# Patient Record
Sex: MALE | Race: WHITE | NOT HISPANIC OR LATINO
[De-identification: names, ages, dates, MRNs, and addresses within clinical notes are randomized per-mention and may not be internally consistent; named-entity substitution may affect disease eponyms.]

---

## 2017-03-01 ENCOUNTER — CHARTING TRANS (OUTPATIENT)
Dept: OTHER | Age: 62
End: 2017-03-01

## 2017-03-03 ENCOUNTER — LAB SERVICES (OUTPATIENT)
Dept: OTHER | Age: 62
End: 2017-03-03

## 2017-03-04 LAB
ALBUMIN SERPL-MCNC: 3.9 G/DL (ref 3.6–5.1)
ALBUMIN/GLOB SERPL: 1.5 (ref 1–2.4)
ALP SERPL-CCNC: 62 UNITS/L (ref 45–117)
ALT SERPL-CCNC: 50 UNITS/L
ANION GAP SERPL CALC-SCNC: 13 MMOL/L (ref 10–20)
AST SERPL-CCNC: 26 UNITS/L
BASOPHILS # BLD: 0 K/MCL (ref 0–0.3)
BASOPHILS NFR BLD: 1 %
BILIRUB SERPL-MCNC: 0.6 MG/DL (ref 0.2–1)
BUN SERPL-MCNC: 17 MG/DL (ref 6–20)
BUN/CREAT SERPL: 16 (ref 7–25)
CALCIUM SERPL-MCNC: 9.1 MG/DL (ref 8.4–10.2)
CHLORIDE SERPL-SCNC: 107 MMOL/L (ref 98–107)
CHOLEST SERPL-MCNC: 176 MG/DL
CHOLEST/HDLC SERPL: 3.5
CO2 SERPL-SCNC: 28 MMOL/L (ref 21–32)
CREAT SERPL-MCNC: 1.04 MG/DL (ref 0.67–1.17)
DIFFERENTIAL METHOD BLD: NORMAL
EOSINOPHIL # BLD: 0.3 K/MCL (ref 0.1–0.5)
EOSINOPHIL NFR BLD: 4 %
ERYTHROCYTE [DISTWIDTH] IN BLOOD: 13.4 % (ref 11–15)
GLOBULIN SER-MCNC: 2.6 G/DL (ref 2–4)
GLUCOSE SERPL-MCNC: 106 MG/DL (ref 65–99)
HBA1C MFR BLD: 6 % (ref 4.5–5.6)
HDLC SERPL-MCNC: 51 MG/DL
HEMATOCRIT: 48.6 % (ref 39–51)
HEMOGLOBIN: 16.1 G/DL (ref 13–17)
LDLC SERPL CALC-MCNC: 103 MG/DL
LENGTH OF FAST TIME PATIENT: ABNORMAL HRS
LENGTH OF FAST TIME PATIENT: ABNORMAL HRS
LYMPHOCYTES # BLD: 2.4 K/MCL (ref 1–4)
LYMPHOCYTES NFR BLD: 42 %
MEAN CORPUSCULAR HEMOGLOBIN: 30.6 PG (ref 26–34)
MEAN CORPUSCULAR HGB CONC: 33.1 G/DL (ref 32–36.5)
MEAN CORPUSCULAR VOLUME: 92.2 FL (ref 78–100)
MONOCYTES # BLD: 0.4 K/MCL (ref 0.3–0.9)
MONOCYTES NFR BLD: 8 %
NEUTROPHILS # BLD: 2.6 K/MCL (ref 1.8–7.7)
NEUTROPHILS NFR BLD: 45 %
NONHDLC SERPL-MCNC: 125 MG/DL
PLATELET COUNT: 190 K/MCL (ref 140–450)
POTASSIUM SERPL-SCNC: 4.3 MMOL/L (ref 3.4–5.1)
RED CELL COUNT: 5.27 MIL/MCL (ref 4.5–5.9)
SODIUM SERPL-SCNC: 144 MMOL/L (ref 135–145)
TOTAL PROTEIN: 6.5 G/DL (ref 6.4–8.2)
TRIGL SERPL-MCNC: 112 MG/DL
WHITE BLOOD COUNT: 5.7 K/MCL (ref 4.2–11)

## 2017-03-23 ENCOUNTER — CHARTING TRANS (OUTPATIENT)
Dept: OTHER | Age: 62
End: 2017-03-23

## 2017-07-27 ENCOUNTER — LAB SERVICES (OUTPATIENT)
Dept: OTHER | Age: 62
End: 2017-07-27

## 2017-07-30 LAB
ALBUMIN SERPL-MCNC: 3.9 G/DL (ref 3.6–5.1)
ALBUMIN/GLOB SERPL: 1.5 (ref 1–2.4)
ALP SERPL-CCNC: 65 UNITS/L (ref 45–117)
ALT SERPL-CCNC: 37 UNITS/L
ANION GAP SERPL CALC-SCNC: 12 MMOL/L (ref 10–20)
AST SERPL-CCNC: 24 UNITS/L
BASOPHILS # BLD: 0 K/MCL (ref 0–0.3)
BASOPHILS NFR BLD: 0 %
BILIRUB SERPL-MCNC: 0.6 MG/DL (ref 0.2–1)
BUN SERPL-MCNC: 18 MG/DL (ref 6–20)
BUN/CREAT SERPL: 16 (ref 7–25)
CALCIUM SERPL-MCNC: 8.8 MG/DL (ref 8.4–10.2)
CHLORIDE SERPL-SCNC: 107 MMOL/L (ref 98–107)
CO2 SERPL-SCNC: 29 MMOL/L (ref 21–32)
CREAT SERPL-MCNC: 1.1 MG/DL (ref 0.67–1.17)
DIFFERENTIAL METHOD BLD: NORMAL
EOSINOPHIL # BLD: 0.2 K/MCL (ref 0.1–0.5)
EOSINOPHIL NFR BLD: 4 %
ERYTHROCYTE [DISTWIDTH] IN BLOOD: 13.6 % (ref 11–15)
GLOBULIN SER-MCNC: 2.6 G/DL (ref 2–4)
GLUCOSE SERPL-MCNC: 90 MG/DL (ref 65–99)
HBA1C MFR BLD: 6 % (ref 4.5–5.6)
HEMATOCRIT: 45.9 % (ref 39–51)
HEMOGLOBIN: 15.2 G/DL (ref 13–17)
LENGTH OF FAST TIME PATIENT: NORMAL HRS
LYMPHOCYTES # BLD: 2.1 K/MCL (ref 1–4)
LYMPHOCYTES NFR BLD: 38 %
MEAN CORPUSCULAR HEMOGLOBIN: 30.4 PG (ref 26–34)
MEAN CORPUSCULAR HGB CONC: 33.1 G/DL (ref 32–36.5)
MEAN CORPUSCULAR VOLUME: 91.8 FL (ref 78–100)
MONOCYTES # BLD: 0.4 K/MCL (ref 0.3–0.9)
MONOCYTES NFR BLD: 8 %
NEUTROPHILS # BLD: 2.8 K/MCL (ref 1.8–7.7)
NEUTROPHILS NFR BLD: 50 %
PLATELET COUNT: 180 K/MCL (ref 140–450)
POTASSIUM SERPL-SCNC: 4.2 MMOL/L (ref 3.4–5.1)
RED CELL COUNT: 5 MIL/MCL (ref 4.5–5.9)
SODIUM SERPL-SCNC: 144 MMOL/L (ref 135–145)
TOTAL PROTEIN: 6.5 G/DL (ref 6.4–8.2)
WHITE BLOOD COUNT: 5.5 K/MCL (ref 4.2–11)

## 2017-07-31 ENCOUNTER — CHARTING TRANS (OUTPATIENT)
Dept: OTHER | Age: 62
End: 2017-07-31

## 2018-04-03 ENCOUNTER — LAB SERVICES (OUTPATIENT)
Dept: OTHER | Age: 63
End: 2018-04-03

## 2018-04-04 ENCOUNTER — CHARTING TRANS (OUTPATIENT)
Dept: OTHER | Age: 63
End: 2018-04-04

## 2018-04-04 LAB
ALBUMIN SERPL-MCNC: 3.8 G/DL (ref 3.6–5.1)
ALBUMIN/GLOB SERPL: 1.2 (ref 1–2.4)
ALP SERPL-CCNC: 66 UNITS/L (ref 45–117)
ALT SERPL-CCNC: 42 UNITS/L
ANION GAP SERPL CALC-SCNC: 10 MMOL/L (ref 10–20)
AST SERPL-CCNC: 27 UNITS/L
BILIRUB SERPL-MCNC: 0.7 MG/DL (ref 0.2–1)
BUN SERPL-MCNC: 16 MG/DL (ref 6–20)
BUN/CREAT SERPL: 15 (ref 7–25)
CALCIUM SERPL-MCNC: 9.6 MG/DL (ref 8.4–10.2)
CHLORIDE SERPL-SCNC: 105 MMOL/L (ref 98–107)
CHOLEST SERPL-MCNC: 215 MG/DL
CHOLEST/HDLC SERPL: 4.1
CO2 SERPL-SCNC: 31 MMOL/L (ref 21–32)
CREAT SERPL-MCNC: 1.09 MG/DL (ref 0.67–1.17)
GLOBULIN SER-MCNC: 3.1 G/DL (ref 2–4)
GLUCOSE SERPL-MCNC: 116 MG/DL (ref 65–99)
HBA1C MFR BLD: 6 % (ref 4.5–5.6)
HDLC SERPL-MCNC: 52 MG/DL
LDLC SERPL CALC-MCNC: 139 MG/DL
LENGTH OF FAST TIME PATIENT: ABNORMAL HRS
LENGTH OF FAST TIME PATIENT: ABNORMAL HRS
NONHDLC SERPL-MCNC: 163 MG/DL
POTASSIUM SERPL-SCNC: 4.2 MMOL/L (ref 3.4–5.1)
SODIUM SERPL-SCNC: 142 MMOL/L (ref 135–145)
TOTAL PROTEIN: 6.9 G/DL (ref 6.4–8.2)
TRIGL SERPL-MCNC: 121 MG/DL

## 2018-04-11 ENCOUNTER — CHARTING TRANS (OUTPATIENT)
Dept: OTHER | Age: 63
End: 2018-04-11

## 2018-10-18 ENCOUNTER — LAB SERVICES (OUTPATIENT)
Dept: OTHER | Age: 63
End: 2018-10-18

## 2018-10-19 ENCOUNTER — CHARTING TRANS (OUTPATIENT)
Dept: OTHER | Age: 63
End: 2018-10-19

## 2018-10-19 LAB
ALBUMIN SERPL-MCNC: 4.1 G/DL (ref 3.6–5.1)
ALBUMIN/GLOB SERPL: 1.6 (ref 1–2.4)
ALP SERPL-CCNC: 69 UNITS/L (ref 45–117)
ALT SERPL-CCNC: 43 UNITS/L
ANION GAP SERPL CALC-SCNC: 15 MMOL/L (ref 10–20)
AST SERPL-CCNC: 30 UNITS/L
BILIRUB SERPL-MCNC: 0.9 MG/DL (ref 0.2–1)
BUN SERPL-MCNC: 20 MG/DL (ref 6–20)
BUN/CREAT SERPL: 20 (ref 7–25)
CALCIUM SERPL-MCNC: 9 MG/DL (ref 8.4–10.2)
CHLORIDE SERPL-SCNC: 107 MMOL/L (ref 98–107)
CHOLEST SERPL-MCNC: 114 MG/DL
CHOLEST/HDLC SERPL: 2
CO2 SERPL-SCNC: 25 MMOL/L (ref 21–32)
CREAT SERPL-MCNC: 1.02 MG/DL (ref 0.67–1.17)
GLOBULIN SER-MCNC: 2.5 G/DL (ref 2–4)
GLUCOSE SERPL-MCNC: 105 MG/DL (ref 65–99)
HBA1C MFR BLD: 5.9 % (ref 4.5–5.6)
HDLC SERPL-MCNC: 57 MG/DL
LDLC SERPL CALC-MCNC: 43 MG/DL
LENGTH OF FAST TIME PATIENT: ABNORMAL HRS
LENGTH OF FAST TIME PATIENT: NORMAL HRS
NONHDLC SERPL-MCNC: 57 MG/DL
POTASSIUM SERPL-SCNC: 4.3 MMOL/L (ref 3.4–5.1)
SODIUM SERPL-SCNC: 143 MMOL/L (ref 135–145)
TOTAL PROTEIN: 6.6 G/DL (ref 6.4–8.2)
TRIGL SERPL-MCNC: 68 MG/DL

## 2018-10-22 ENCOUNTER — CHARTING TRANS (OUTPATIENT)
Dept: OTHER | Age: 63
End: 2018-10-22

## 2018-10-22 ENCOUNTER — MYAURORA ACCOUNT LINK (OUTPATIENT)
Dept: OTHER | Age: 63
End: 2018-10-22

## 2018-11-01 VITALS
HEIGHT: 68 IN | HEART RATE: 70 BPM | BODY MASS INDEX: 36.12 KG/M2 | RESPIRATION RATE: 18 BRPM | WEIGHT: 238.32 LBS | OXYGEN SATURATION: 96 % | DIASTOLIC BLOOD PRESSURE: 92 MMHG | SYSTOLIC BLOOD PRESSURE: 144 MMHG

## 2018-11-03 VITALS
OXYGEN SATURATION: 99 % | SYSTOLIC BLOOD PRESSURE: 120 MMHG | DIASTOLIC BLOOD PRESSURE: 64 MMHG | HEIGHT: 68 IN | RESPIRATION RATE: 18 BRPM | WEIGHT: 235 LBS | HEART RATE: 71 BPM | BODY MASS INDEX: 35.61 KG/M2

## 2018-11-05 VITALS
DIASTOLIC BLOOD PRESSURE: 86 MMHG | HEART RATE: 75 BPM | RESPIRATION RATE: 18 BRPM | WEIGHT: 242 LBS | OXYGEN SATURATION: 99 % | SYSTOLIC BLOOD PRESSURE: 124 MMHG | BODY MASS INDEX: 36.68 KG/M2 | HEIGHT: 68 IN

## 2018-11-05 VITALS
DIASTOLIC BLOOD PRESSURE: 74 MMHG | SYSTOLIC BLOOD PRESSURE: 136 MMHG | HEART RATE: 74 BPM | HEIGHT: 68 IN | TEMPERATURE: 98.1 F | WEIGHT: 241 LBS | RESPIRATION RATE: 18 BRPM | BODY MASS INDEX: 36.53 KG/M2

## 2018-11-27 VITALS
OXYGEN SATURATION: 97 % | DIASTOLIC BLOOD PRESSURE: 82 MMHG | HEART RATE: 59 BPM | BODY MASS INDEX: 35.62 KG/M2 | SYSTOLIC BLOOD PRESSURE: 164 MMHG | WEIGHT: 235.01 LBS | RESPIRATION RATE: 16 BRPM | TEMPERATURE: 97.9 F | HEIGHT: 68 IN

## 2018-12-20 RX ORDER — LOSARTAN POTASSIUM 100 MG/1
TABLET ORAL
COMMUNITY
Start: 2018-10-22 | End: 2019-05-19 | Stop reason: SDUPTHER

## 2018-12-20 RX ORDER — PRAMIPEXOLE DIHYDROCHLORIDE 0.25 MG/1
TABLET ORAL
COMMUNITY
Start: 2018-05-14 | End: 2019-01-29 | Stop reason: SDUPTHER

## 2018-12-20 RX ORDER — LOSARTAN POTASSIUM 25 MG/1
TABLET ORAL
COMMUNITY
Start: 2018-04-30 | End: 2018-12-24 | Stop reason: ALTCHOICE

## 2018-12-20 RX ORDER — ATORVASTATIN CALCIUM 40 MG/1
TABLET, FILM COATED ORAL
COMMUNITY
Start: 2018-05-14 | End: 2019-02-05 | Stop reason: SDUPTHER

## 2018-12-24 ENCOUNTER — OFFICE VISIT (OUTPATIENT)
Dept: NEUROLOGY | Age: 63
End: 2018-12-24

## 2018-12-24 VITALS
HEIGHT: 68 IN | BODY MASS INDEX: 34.86 KG/M2 | DIASTOLIC BLOOD PRESSURE: 74 MMHG | WEIGHT: 230 LBS | SYSTOLIC BLOOD PRESSURE: 126 MMHG

## 2018-12-24 DIAGNOSIS — G25.81 RLS (RESTLESS LEGS SYNDROME): Primary | ICD-10-CM

## 2018-12-24 DIAGNOSIS — R53.83 OTHER FATIGUE: ICD-10-CM

## 2018-12-24 DIAGNOSIS — M54.16 LEFT LUMBAR RADICULOPATHY: ICD-10-CM

## 2018-12-24 DIAGNOSIS — E61.1 IRON DEFICIENCY: ICD-10-CM

## 2018-12-24 PROBLEM — G43.109 MIGRAINE HEADACHE WITH AURA: Status: ACTIVE | Noted: 2018-12-24

## 2018-12-24 PROCEDURE — 99202 OFFICE O/P NEW SF 15 MIN: CPT | Performed by: SPECIALIST

## 2018-12-24 ASSESSMENT — ENCOUNTER SYMPTOMS
PSYCHIATRIC NEGATIVE: 1
RESPIRATORY NEGATIVE: 1
CONSTITUTIONAL NEGATIVE: 1

## 2018-12-26 ENCOUNTER — LAB SERVICES (OUTPATIENT)
Dept: LAB | Age: 63
End: 2018-12-26

## 2018-12-26 DIAGNOSIS — G25.81 RLS (RESTLESS LEGS SYNDROME): ICD-10-CM

## 2018-12-26 PROCEDURE — 84443 ASSAY THYROID STIM HORMONE: CPT | Performed by: SPECIALIST

## 2018-12-26 PROCEDURE — 36415 COLL VENOUS BLD VENIPUNCTURE: CPT | Performed by: SPECIALIST

## 2018-12-26 PROCEDURE — 82607 VITAMIN B-12: CPT | Performed by: SPECIALIST

## 2018-12-26 PROCEDURE — 82728 ASSAY OF FERRITIN: CPT | Performed by: SPECIALIST

## 2018-12-26 PROCEDURE — 82746 ASSAY OF FOLIC ACID SERUM: CPT | Performed by: SPECIALIST

## 2018-12-27 LAB
FERRITIN SERPL-MCNC: 143 NG/ML (ref 26–388)
FOLATE SERPL-MCNC: >24 NG/ML
TSH SERPL-ACNC: 0.87 MCUNITS/ML (ref 0.35–5)
VIT B12 SERPL-MCNC: 909 PG/ML (ref 211–911)

## 2019-01-05 ENCOUNTER — OFFICE VISIT (OUTPATIENT)
Dept: URGENT CARE | Age: 64
End: 2019-01-05

## 2019-01-05 VITALS
BODY MASS INDEX: 34.86 KG/M2 | WEIGHT: 230 LBS | RESPIRATION RATE: 22 BRPM | SYSTOLIC BLOOD PRESSURE: 134 MMHG | OXYGEN SATURATION: 97 % | HEIGHT: 68 IN | TEMPERATURE: 97.8 F | HEART RATE: 57 BPM | DIASTOLIC BLOOD PRESSURE: 81 MMHG

## 2019-01-05 DIAGNOSIS — J32.9 SINUSITIS, UNSPECIFIED CHRONICITY, UNSPECIFIED LOCATION: Primary | ICD-10-CM

## 2019-01-05 PROCEDURE — 99203 OFFICE O/P NEW LOW 30 MIN: CPT | Performed by: PHYSICIAN ASSISTANT

## 2019-01-05 RX ORDER — AMOXICILLIN 500 MG/1
500 TABLET, FILM COATED ORAL 2 TIMES DAILY
Qty: 20 TABLET | Refills: 0 | Status: SHIPPED | OUTPATIENT
Start: 2019-01-05 | End: 2019-01-15

## 2019-01-08 ENCOUNTER — OFFICE VISIT (OUTPATIENT)
Dept: NEUROLOGY | Age: 64
End: 2019-01-08

## 2019-01-08 VITALS
HEIGHT: 68 IN | BODY MASS INDEX: 34.86 KG/M2 | DIASTOLIC BLOOD PRESSURE: 75 MMHG | WEIGHT: 230 LBS | RESPIRATION RATE: 16 BRPM | SYSTOLIC BLOOD PRESSURE: 120 MMHG

## 2019-01-08 DIAGNOSIS — G25.81 RLS (RESTLESS LEGS SYNDROME): ICD-10-CM

## 2019-01-08 DIAGNOSIS — M54.16 LEFT LUMBAR RADICULOPATHY: ICD-10-CM

## 2019-01-08 DIAGNOSIS — R20.2 PARESTHESIA OF BOTH LEGS: Primary | ICD-10-CM

## 2019-01-08 PROCEDURE — 95909 NRV CNDJ TST 5-6 STUDIES: CPT | Performed by: SPECIALIST

## 2019-01-08 PROCEDURE — 95886 MUSC TEST DONE W/N TEST COMP: CPT | Performed by: SPECIALIST

## 2019-01-08 PROCEDURE — 99212 OFFICE O/P EST SF 10 MIN: CPT | Performed by: SPECIALIST

## 2019-01-29 RX ORDER — PRAMIPEXOLE DIHYDROCHLORIDE 0.25 MG/1
TABLET ORAL
Qty: 60 TABLET | Refills: 2 | Status: SHIPPED | OUTPATIENT
Start: 2019-01-29 | End: 2019-04-27 | Stop reason: SDUPTHER

## 2019-01-29 RX ORDER — PRAMIPEXOLE DIHYDROCHLORIDE 0.25 MG/1
TABLET ORAL
Qty: 60 TABLET | Refills: 2 | Status: SHIPPED | OUTPATIENT
Start: 2019-01-29 | End: 2019-01-29 | Stop reason: SDUPTHER

## 2019-02-06 RX ORDER — ATORVASTATIN CALCIUM 40 MG/1
TABLET, FILM COATED ORAL
Qty: 90 TABLET | Refills: 0 | Status: SHIPPED | OUTPATIENT
Start: 2019-02-06 | End: 2019-05-02 | Stop reason: SDUPTHER

## 2019-02-21 ENCOUNTER — APPOINTMENT (OUTPATIENT)
Dept: NEUROLOGY | Age: 64
End: 2019-02-21

## 2019-03-06 ENCOUNTER — OFFICE VISIT (OUTPATIENT)
Dept: NEUROLOGY | Age: 64
End: 2019-03-06

## 2019-03-06 VITALS
SYSTOLIC BLOOD PRESSURE: 116 MMHG | WEIGHT: 230 LBS | DIASTOLIC BLOOD PRESSURE: 72 MMHG | BODY MASS INDEX: 34.07 KG/M2 | HEIGHT: 69 IN

## 2019-03-06 DIAGNOSIS — M54.16 LEFT LUMBAR RADICULOPATHY: ICD-10-CM

## 2019-03-06 DIAGNOSIS — G25.81 RLS (RESTLESS LEGS SYNDROME): Primary | ICD-10-CM

## 2019-03-06 PROCEDURE — 99214 OFFICE O/P EST MOD 30 MIN: CPT | Performed by: SPECIALIST

## 2019-03-06 ASSESSMENT — ENCOUNTER SYMPTOMS
RESPIRATORY NEGATIVE: 1
DIZZINESS: 0
TREMORS: 0
CONSTITUTIONAL NEGATIVE: 1
HEADACHES: 0
NUMBNESS: 1
EYES NEGATIVE: 1
WEAKNESS: 0
PSYCHIATRIC NEGATIVE: 1
GASTROINTESTINAL NEGATIVE: 1

## 2019-03-06 ASSESSMENT — VISUAL ACUITY: VA_NORMAL: 1

## 2019-04-29 RX ORDER — PRAMIPEXOLE DIHYDROCHLORIDE 0.25 MG/1
TABLET ORAL
Qty: 60 TABLET | Refills: 0 | Status: SHIPPED | OUTPATIENT
Start: 2019-04-29 | End: 2019-05-26 | Stop reason: SDUPTHER

## 2019-05-02 RX ORDER — ATORVASTATIN CALCIUM 40 MG/1
TABLET, FILM COATED ORAL
Qty: 90 TABLET | Refills: 0 | Status: SHIPPED | OUTPATIENT
Start: 2019-05-02 | End: 2019-07-26 | Stop reason: SDUPTHER

## 2019-05-20 RX ORDER — LOSARTAN POTASSIUM 100 MG/1
TABLET ORAL
Qty: 90 TABLET | Refills: 0 | Status: SHIPPED | OUTPATIENT
Start: 2019-05-20 | End: 2019-08-23 | Stop reason: SDUPTHER

## 2019-05-28 RX ORDER — PRAMIPEXOLE DIHYDROCHLORIDE 0.25 MG/1
TABLET ORAL
Qty: 60 TABLET | Refills: 0 | Status: SHIPPED | OUTPATIENT
Start: 2019-05-28 | End: 2019-09-03 | Stop reason: SDUPTHER

## 2019-07-26 RX ORDER — ATORVASTATIN CALCIUM 40 MG/1
TABLET, FILM COATED ORAL
Qty: 30 TABLET | Refills: 0 | Status: SHIPPED | OUTPATIENT
Start: 2019-07-26 | End: 2019-08-26 | Stop reason: SDUPTHER

## 2019-07-26 RX ORDER — ATORVASTATIN CALCIUM 40 MG/1
TABLET, FILM COATED ORAL
Qty: 90 TABLET | Refills: 0 | OUTPATIENT
Start: 2019-07-26

## 2019-08-05 ENCOUNTER — TELEPHONE (OUTPATIENT)
Dept: FAMILY MEDICINE | Age: 64
End: 2019-08-05

## 2019-08-05 DIAGNOSIS — Z12.5 SCREENING PSA (PROSTATE SPECIFIC ANTIGEN): ICD-10-CM

## 2019-08-05 DIAGNOSIS — R73.03 PREDIABETES: Primary | ICD-10-CM

## 2019-08-05 DIAGNOSIS — E78.5 DYSLIPIDEMIA: ICD-10-CM

## 2019-08-07 ENCOUNTER — LAB SERVICES (OUTPATIENT)
Dept: FAMILY MEDICINE | Age: 64
End: 2019-08-07

## 2019-08-07 DIAGNOSIS — Z12.5 SCREENING PSA (PROSTATE SPECIFIC ANTIGEN): ICD-10-CM

## 2019-08-07 DIAGNOSIS — R73.03 PREDIABETES: ICD-10-CM

## 2019-08-07 DIAGNOSIS — E78.5 DYSLIPIDEMIA: ICD-10-CM

## 2019-08-07 PROCEDURE — G0103 PSA SCREENING: HCPCS | Performed by: FAMILY MEDICINE

## 2019-08-07 PROCEDURE — 80053 COMPREHEN METABOLIC PANEL: CPT | Performed by: FAMILY MEDICINE

## 2019-08-07 PROCEDURE — 36415 COLL VENOUS BLD VENIPUNCTURE: CPT

## 2019-08-07 PROCEDURE — 83036 HEMOGLOBIN GLYCOSYLATED A1C: CPT | Performed by: FAMILY MEDICINE

## 2019-08-07 PROCEDURE — 80061 LIPID PANEL: CPT | Performed by: FAMILY MEDICINE

## 2019-08-07 PROCEDURE — 85025 COMPLETE CBC W/AUTO DIFF WBC: CPT | Performed by: FAMILY MEDICINE

## 2019-08-08 LAB
ALBUMIN SERPL-MCNC: 4.1 G/DL (ref 3.6–5.1)
ALBUMIN/GLOB SERPL: 1.6 {RATIO} (ref 1–2.4)
ALP SERPL-CCNC: 70 UNITS/L (ref 45–117)
ALT SERPL-CCNC: 38 UNITS/L
ANION GAP SERPL CALC-SCNC: 12 MMOL/L (ref 10–20)
AST SERPL-CCNC: 24 UNITS/L
BASOPHILS # BLD AUTO: 0 K/MCL (ref 0–0.3)
BASOPHILS NFR BLD AUTO: 0 %
BILIRUB SERPL-MCNC: 0.7 MG/DL (ref 0.2–1)
BUN SERPL-MCNC: 19 MG/DL (ref 6–20)
BUN/CREAT SERPL: 19 (ref 7–25)
CALCIUM SERPL-MCNC: 9.2 MG/DL (ref 8.4–10.2)
CHLORIDE SERPL-SCNC: 111 MMOL/L (ref 98–107)
CHOLEST SERPL-MCNC: 115 MG/DL
CHOLEST/HDLC SERPL: 2 {RATIO}
CO2 SERPL-SCNC: 26 MMOL/L (ref 21–32)
CREAT SERPL-MCNC: 1.02 MG/DL (ref 0.67–1.17)
DIFFERENTIAL METHOD BLD: NORMAL
EOSINOPHIL # BLD AUTO: 0.4 K/MCL (ref 0.1–0.5)
EOSINOPHIL NFR SPEC: 6 %
ERYTHROCYTE [DISTWIDTH] IN BLOOD: 13.1 % (ref 11–15)
FASTING STATUS PATIENT QL REPORTED: ABNORMAL HRS
GLOBULIN SER-MCNC: 2.5 G/DL (ref 2–4)
GLUCOSE SERPL-MCNC: 119 MG/DL (ref 65–99)
HBA1C MFR BLD: 5.9 % (ref 4.5–5.6)
HCT VFR BLD CALC: 46.4 % (ref 39–51)
HDLC SERPL-MCNC: 58 MG/DL
HGB BLD-MCNC: 15.2 G/DL (ref 13–17)
IMM GRANULOCYTES # BLD AUTO: 0 K/MCL (ref 0–0.2)
IMM GRANULOCYTES NFR BLD: 0 %
LDLC SERPL-MCNC: 38 MG/DL
LENGTH OF FAST TIME PATIENT: NORMAL HRS
LYMPHOCYTES # BLD MANUAL: 1.9 K/MCL (ref 1–4)
LYMPHOCYTES NFR BLD MANUAL: 30 %
MCH RBC QN AUTO: 30 PG (ref 26–34)
MCHC RBC AUTO-ENTMCNC: 32.8 G/DL (ref 32–36.5)
MCV RBC AUTO: 91.5 FL (ref 78–100)
MONOCYTES # BLD MANUAL: 0.4 K/MCL (ref 0.3–0.9)
MONOCYTES NFR BLD MANUAL: 7 %
NEUTROPHILS # BLD: 3.6 K/MCL (ref 1.8–7.7)
NEUTROPHILS NFR BLD AUTO: 57 %
NONHDLC SERPL-MCNC: 57 MG/DL
NRBC BLD MANUAL-RTO: 0 /100 WBC
PLATELET # BLD: 181 K/MCL (ref 140–450)
POTASSIUM SERPL-SCNC: 4.3 MMOL/L (ref 3.4–5.1)
PROT SERPL-MCNC: 6.6 G/DL (ref 6.4–8.2)
PSA SERPL-MCNC: 0.73 NG/ML
RBC # BLD: 5.07 MIL/MCL (ref 4.5–5.9)
SODIUM SERPL-SCNC: 145 MMOL/L (ref 135–145)
TRIGL SERPL-MCNC: 94 MG/DL
WBC # BLD: 6.3 K/MCL (ref 4.2–11)

## 2019-08-12 PROBLEM — Z52.4 KIDNEY DONOR: Status: ACTIVE | Noted: 2019-08-12

## 2019-08-12 PROBLEM — B07.0 PLANTAR WARTS: Status: ACTIVE | Noted: 2019-08-12

## 2019-08-12 PROBLEM — L25.9 CONTACT DERMATITIS: Status: ACTIVE | Noted: 2019-08-12

## 2019-08-12 PROBLEM — M62.830 MUSCLE SPASM OF BACK: Status: ACTIVE | Noted: 2019-08-12

## 2019-08-12 PROBLEM — I10 BENIGN ESSENTIAL HYPERTENSION: Status: ACTIVE | Noted: 2019-08-12

## 2019-08-12 PROBLEM — Z12.11 ENCOUNTER FOR SCREENING FOR MALIGNANT NEOPLASM OF COLON: Status: ACTIVE | Noted: 2019-08-12

## 2019-08-12 PROBLEM — R73.03 PREDIABETES: Status: ACTIVE | Noted: 2019-08-12

## 2019-08-12 PROBLEM — R73.01 ELEVATED FASTING GLUCOSE: Status: ACTIVE | Noted: 2019-08-12

## 2019-08-12 PROBLEM — N45.3 ORCHITIS AND EPIDIDYMITIS: Status: ACTIVE | Noted: 2019-08-12

## 2019-08-12 PROBLEM — R07.9 CHEST PAIN: Status: ACTIVE | Noted: 2019-08-12

## 2019-08-12 PROBLEM — R73.09 ABNORMAL GLUCOSE: Status: ACTIVE | Noted: 2019-08-12

## 2019-08-12 PROBLEM — E78.5 HYPERLIPIDEMIA: Status: ACTIVE | Noted: 2019-08-12

## 2019-08-12 PROBLEM — S82.209A CLOSED FRACTURE OF TIBIA, SHAFT: Status: ACTIVE | Noted: 2019-08-12

## 2019-08-12 PROBLEM — H61.21 IMPACTED CERUMEN OF RIGHT EAR: Status: ACTIVE | Noted: 2019-08-12

## 2019-08-12 PROBLEM — N20.0 STAGHORN CALCULUS: Status: ACTIVE | Noted: 2019-08-12

## 2019-08-12 PROBLEM — H61.21 EXCESSIVE CERUMEN IN RIGHT EAR CANAL: Status: ACTIVE | Noted: 2019-08-12

## 2019-08-12 PROBLEM — M54.50 LOWER BACK PAIN: Status: ACTIVE | Noted: 2019-08-12

## 2019-08-12 PROBLEM — E66.9 OBESITY: Status: ACTIVE | Noted: 2019-08-12

## 2019-08-14 ENCOUNTER — OFFICE VISIT (OUTPATIENT)
Dept: FAMILY MEDICINE | Age: 64
End: 2019-08-14

## 2019-08-14 VITALS
DIASTOLIC BLOOD PRESSURE: 74 MMHG | RESPIRATION RATE: 17 BRPM | WEIGHT: 238.1 LBS | SYSTOLIC BLOOD PRESSURE: 126 MMHG | BODY MASS INDEX: 35.27 KG/M2 | OXYGEN SATURATION: 97 % | HEART RATE: 59 BPM | HEIGHT: 69 IN

## 2019-08-14 DIAGNOSIS — I10 BENIGN ESSENTIAL HYPERTENSION: Primary | ICD-10-CM

## 2019-08-14 DIAGNOSIS — M79.673 HEEL PAIN, UNSPECIFIED LATERALITY: ICD-10-CM

## 2019-08-14 DIAGNOSIS — E66.9 CLASS 2 OBESITY WITH BODY MASS INDEX (BMI) OF 35.0 TO 35.9 IN ADULT, UNSPECIFIED OBESITY TYPE, UNSPECIFIED WHETHER SERIOUS COMORBIDITY PRESENT: ICD-10-CM

## 2019-08-14 DIAGNOSIS — R73.03 PREDIABETES: ICD-10-CM

## 2019-08-14 DIAGNOSIS — E78.5 HYPERLIPIDEMIA, UNSPECIFIED HYPERLIPIDEMIA TYPE: ICD-10-CM

## 2019-08-14 PROCEDURE — 3074F SYST BP LT 130 MM HG: CPT | Performed by: FAMILY MEDICINE

## 2019-08-14 PROCEDURE — 3078F DIAST BP <80 MM HG: CPT | Performed by: FAMILY MEDICINE

## 2019-08-14 PROCEDURE — 99214 OFFICE O/P EST MOD 30 MIN: CPT | Performed by: FAMILY MEDICINE

## 2019-08-14 ASSESSMENT — ENCOUNTER SYMPTOMS
VOMITING: 0
WEAKNESS: 0
CHEST TIGHTNESS: 0
FEVER: 0
CHILLS: 0
BACK PAIN: 0
HEADACHES: 0
NUMBNESS: 0
CONSTIPATION: 0
UNEXPECTED WEIGHT CHANGE: 0
DIZZINESS: 0
ABDOMINAL PAIN: 0
SHORTNESS OF BREATH: 0
FATIGUE: 0
CONFUSION: 0
LIGHT-HEADEDNESS: 0
TROUBLE SWALLOWING: 0
BLOOD IN STOOL: 0
COUGH: 0
SORE THROAT: 0
SLEEP DISTURBANCE: 0
DIARRHEA: 0
NAUSEA: 0
RHINORRHEA: 0
WHEEZING: 0

## 2019-08-14 ASSESSMENT — PATIENT HEALTH QUESTIONNAIRE - PHQ9
SUM OF ALL RESPONSES TO PHQ9 QUESTIONS 1 AND 2: 0
SUM OF ALL RESPONSES TO PHQ9 QUESTIONS 1 AND 2: 0
2. FEELING DOWN, DEPRESSED OR HOPELESS: NOT AT ALL
1. LITTLE INTEREST OR PLEASURE IN DOING THINGS: NOT AT ALL

## 2019-08-23 RX ORDER — LOSARTAN POTASSIUM 100 MG/1
TABLET ORAL
Qty: 90 TABLET | Refills: 0 | Status: SHIPPED | OUTPATIENT
Start: 2019-08-23 | End: 2019-11-16 | Stop reason: SDUPTHER

## 2019-08-27 RX ORDER — ATORVASTATIN CALCIUM 40 MG/1
TABLET, FILM COATED ORAL
Qty: 90 TABLET | Refills: 0 | Status: SHIPPED | OUTPATIENT
Start: 2019-08-27 | End: 2019-11-22 | Stop reason: SDUPTHER

## 2019-09-03 RX ORDER — PRAMIPEXOLE DIHYDROCHLORIDE 0.25 MG/1
TABLET ORAL
Qty: 180 TABLET | Refills: 0 | Status: SHIPPED | OUTPATIENT
Start: 2019-09-03 | End: 2020-02-19 | Stop reason: SDUPTHER

## 2019-10-29 ENCOUNTER — OFFICE VISIT (OUTPATIENT)
Dept: URGENT CARE | Age: 64
End: 2019-10-29

## 2019-10-29 DIAGNOSIS — Z23 INFLUENZA VACCINE ADMINISTERED: Primary | ICD-10-CM

## 2019-10-29 PROCEDURE — 90686 IIV4 VACC NO PRSV 0.5 ML IM: CPT

## 2019-10-29 PROCEDURE — 90471 IMMUNIZATION ADMIN: CPT

## 2019-11-18 RX ORDER — LOSARTAN POTASSIUM 100 MG/1
TABLET ORAL
Qty: 90 TABLET | Refills: 0 | Status: SHIPPED | OUTPATIENT
Start: 2019-11-18 | End: 2020-02-14 | Stop reason: SDUPTHER

## 2019-11-21 ENCOUNTER — E-ADVICE (OUTPATIENT)
Dept: FAMILY MEDICINE | Age: 64
End: 2019-11-21

## 2019-11-22 RX ORDER — ATORVASTATIN CALCIUM 40 MG/1
TABLET, FILM COATED ORAL
Qty: 90 TABLET | Refills: 0 | Status: SHIPPED | OUTPATIENT
Start: 2019-11-22 | End: 2020-02-19 | Stop reason: SDUPTHER

## 2020-02-14 RX ORDER — LOSARTAN POTASSIUM 100 MG/1
TABLET ORAL
Qty: 30 TABLET | Refills: 0 | Status: SHIPPED | OUTPATIENT
Start: 2020-02-14 | End: 2020-03-17

## 2020-02-14 RX ORDER — LOSARTAN POTASSIUM 100 MG/1
TABLET ORAL
Qty: 30 TABLET | Refills: 0 | Status: SHIPPED | OUTPATIENT
Start: 2020-02-14 | End: 2020-02-14 | Stop reason: SDUPTHER

## 2020-02-20 RX ORDER — PRAMIPEXOLE DIHYDROCHLORIDE 0.25 MG/1
TABLET ORAL
Qty: 60 TABLET | Refills: 0 | Status: SHIPPED | OUTPATIENT
Start: 2020-02-20

## 2020-02-20 RX ORDER — ATORVASTATIN CALCIUM 40 MG/1
TABLET, FILM COATED ORAL
Qty: 30 TABLET | Refills: 0 | Status: SHIPPED | OUTPATIENT
Start: 2020-02-20 | End: 2020-02-20 | Stop reason: SDUPTHER

## 2020-02-20 RX ORDER — ATORVASTATIN CALCIUM 40 MG/1
TABLET, FILM COATED ORAL
Qty: 30 TABLET | Refills: 0 | Status: SHIPPED | OUTPATIENT
Start: 2020-02-20

## 2020-02-20 RX ORDER — PRAMIPEXOLE DIHYDROCHLORIDE 0.25 MG/1
TABLET ORAL
Qty: 60 TABLET | Refills: 0 | Status: SHIPPED | OUTPATIENT
Start: 2020-02-20 | End: 2020-02-20 | Stop reason: SDUPTHER

## 2020-03-17 RX ORDER — LOSARTAN POTASSIUM 100 MG/1
TABLET ORAL
Qty: 30 TABLET | Refills: 0 | Status: SHIPPED | OUTPATIENT
Start: 2020-03-17

## 2020-04-21 RX ORDER — ATORVASTATIN CALCIUM 40 MG/1
TABLET, FILM COATED ORAL
Qty: 30 TABLET | Refills: 0 | OUTPATIENT
Start: 2020-04-21

## 2020-04-21 RX ORDER — PRAMIPEXOLE DIHYDROCHLORIDE 0.25 MG/1
TABLET ORAL
Qty: 60 TABLET | Refills: 0 | OUTPATIENT
Start: 2020-04-21

## 2020-05-04 ENCOUNTER — E-ADVICE (OUTPATIENT)
Dept: NEUROLOGY | Age: 65
End: 2020-05-04

## 2020-05-04 RX ORDER — ATORVASTATIN CALCIUM 40 MG/1
TABLET, FILM COATED ORAL
Qty: 30 TABLET | Refills: 0 | OUTPATIENT
Start: 2020-05-04

## 2020-05-05 ENCOUNTER — E-ADVICE (OUTPATIENT)
Dept: FAMILY MEDICINE | Age: 65
End: 2020-05-05

## 2020-05-27 ENCOUNTER — TELEPHONE (OUTPATIENT)
Dept: NEUROLOGY | Age: 65
End: 2020-05-27

## 2022-04-27 ENCOUNTER — PATIENT OUTREACH (OUTPATIENT)
Dept: FAMILY MEDICINE CLINIC | Facility: CLINIC | Age: 67
End: 2022-04-27

## 2022-04-29 ENCOUNTER — TELEPHONE (OUTPATIENT)
Dept: FAMILY MEDICINE CLINIC | Facility: CLINIC | Age: 67
End: 2022-04-29

## 2022-05-04 ENCOUNTER — OFFICE VISIT (OUTPATIENT)
Dept: FAMILY MEDICINE CLINIC | Facility: CLINIC | Age: 67
End: 2022-05-04
Payer: MEDICARE

## 2022-05-04 ENCOUNTER — TELEPHONE (OUTPATIENT)
Dept: FAMILY MEDICINE CLINIC | Facility: CLINIC | Age: 67
End: 2022-05-04

## 2022-05-04 VITALS
OXYGEN SATURATION: 96 % | TEMPERATURE: 97 F | DIASTOLIC BLOOD PRESSURE: 62 MMHG | HEART RATE: 74 BPM | WEIGHT: 245 LBS | BODY MASS INDEX: 37.56 KG/M2 | SYSTOLIC BLOOD PRESSURE: 128 MMHG | HEIGHT: 67.6 IN

## 2022-05-04 DIAGNOSIS — Z12.5 SCREENING FOR MALIGNANT NEOPLASM OF PROSTATE: ICD-10-CM

## 2022-05-04 DIAGNOSIS — R73.9 HYPERGLYCEMIA: ICD-10-CM

## 2022-05-04 DIAGNOSIS — R21 SKIN RASH: ICD-10-CM

## 2022-05-04 DIAGNOSIS — Z00.00 ENCOUNTER FOR ANNUAL HEALTH EXAMINATION: Primary | ICD-10-CM

## 2022-05-04 DIAGNOSIS — I10 ESSENTIAL HYPERTENSION: ICD-10-CM

## 2022-05-04 DIAGNOSIS — E78.2 MIXED HYPERLIPIDEMIA: ICD-10-CM

## 2022-05-04 DIAGNOSIS — G47.33 OSA (OBSTRUCTIVE SLEEP APNEA): ICD-10-CM

## 2022-05-04 PROCEDURE — 99203 OFFICE O/P NEW LOW 30 MIN: CPT | Performed by: FAMILY MEDICINE

## 2022-05-04 PROCEDURE — G0438 PPPS, INITIAL VISIT: HCPCS | Performed by: FAMILY MEDICINE

## 2022-05-04 RX ORDER — LOSARTAN POTASSIUM 100 MG/1
100 TABLET ORAL DAILY
COMMUNITY
Start: 2022-03-29

## 2022-05-04 RX ORDER — COVID-19 ANTIGEN TEST
220 KIT MISCELLANEOUS 2 TIMES DAILY
COMMUNITY

## 2022-05-04 RX ORDER — ATORVASTATIN CALCIUM 40 MG/1
40 TABLET, FILM COATED ORAL NIGHTLY
COMMUNITY
Start: 2020-02-20

## 2022-05-04 RX ORDER — HYDROCHLOROTHIAZIDE 25 MG/1
TABLET ORAL
COMMUNITY

## 2022-05-04 RX ORDER — PRAMIPEXOLE DIHYDROCHLORIDE 0.25 MG/1
0.25 TABLET ORAL DAILY
COMMUNITY
Start: 2020-02-20

## 2022-05-04 RX ORDER — PERPHENAZINE 16 MG
1 TABLET ORAL 2 TIMES DAILY
COMMUNITY

## 2022-05-04 RX ORDER — CALCIUM CARBONATE/VITAMIN D3 600 MG-10
1 TABLET ORAL
COMMUNITY

## 2022-05-04 RX ORDER — KETOCONAZOLE 20 MG/G
CREAM TOPICAL
COMMUNITY

## 2022-05-04 NOTE — TELEPHONE ENCOUNTER
Patient signed HIPAA medical records authorization form for the Facility identified below to disclose patient health information to [facility]:      [facility] Raisa AntonAlcidesdavid, Jayleneteresa  Phone: [phone]  Fax # [phone]    Specific PHI to be disclosed: Colonoscopy      Medical Records Request/Authorization form has been faxed to above Facility/Provider. Received fax confirmation. MR Authorization form has also been sent to scanning.

## 2022-05-05 PROCEDURE — 3051F HG A1C>EQUAL 7.0%<8.0%: CPT | Performed by: FAMILY MEDICINE

## 2022-05-06 LAB
ABSOLUTE BASOPHILS: 33 CELLS/UL (ref 0–200)
ABSOLUTE EOSINOPHILS: 284 CELLS/UL (ref 15–500)
ABSOLUTE LYMPHOCYTES: 1657 CELLS/UL (ref 850–3900)
ABSOLUTE MONOCYTES: 429 CELLS/UL (ref 200–950)
ABSOLUTE NEUTROPHILS: 4198 CELLS/UL (ref 1500–7800)
ALBUMIN/GLOBULIN RATIO: 1.6 (CALC) (ref 1–2.5)
ALBUMIN: 4.1 G/DL (ref 3.6–5.1)
ALKALINE PHOSPHATASE: 73 U/L (ref 35–144)
ALT: 41 U/L (ref 9–46)
AST: 31 U/L (ref 10–35)
BASOPHILS: 0.5 %
BILIRUBIN, TOTAL: 0.9 MG/DL (ref 0.2–1.2)
BUN: 22 MG/DL (ref 7–25)
CALCIUM: 9.7 MG/DL (ref 8.6–10.3)
CARBON DIOXIDE: 29 MMOL/L (ref 20–32)
CHLORIDE: 104 MMOL/L (ref 98–110)
CHOL/HDLC RATIO: 2.6 (CALC)
CHOLESTEROL, TOTAL: 125 MG/DL
CREATININE: 1.01 MG/DL (ref 0.7–1.25)
EGFR IF AFRICN AM: 89 ML/MIN/1.73M2
EGFR IF NONAFRICN AM: 77 ML/MIN/1.73M2
EOSINOPHILS: 4.3 %
GLOBULIN: 2.6 G/DL (CALC) (ref 1.9–3.7)
GLUCOSE: 135 MG/DL (ref 65–99)
HDL CHOLESTEROL: 48 MG/DL
HEMATOCRIT: 45.8 % (ref 38.5–50)
HEMOGLOBIN A1C: 7.1 % OF TOTAL HGB
HEMOGLOBIN: 15.8 G/DL (ref 13.2–17.1)
LDL-CHOLESTEROL: 59 MG/DL (CALC)
LYMPHOCYTES: 25.1 %
MCH: 30.3 PG (ref 27–33)
MCHC: 34.5 G/DL (ref 32–36)
MCV: 87.9 FL (ref 80–100)
MONOCYTES: 6.5 %
MPV: 10.7 FL (ref 7.5–12.5)
NEUTROPHILS: 63.6 %
NON-HDL CHOLESTEROL: 77 MG/DL (CALC)
PLATELET COUNT: 233 THOUSAND/UL (ref 140–400)
POTASSIUM: 4.2 MMOL/L (ref 3.5–5.3)
PROTEIN, TOTAL: 6.7 G/DL (ref 6.1–8.1)
RDW: 12.9 % (ref 11–15)
RED BLOOD CELL COUNT: 5.21 MILLION/UL (ref 4.2–5.8)
SODIUM: 141 MMOL/L (ref 135–146)
TOTAL PSA: 0.6 NG/ML
TRIGLYCERIDES: 92 MG/DL
TSH W/REFLEX TO FT4: 1.26 MIU/L (ref 0.4–4.5)
WHITE BLOOD CELL COUNT: 6.6 THOUSAND/UL (ref 3.8–10.8)

## 2022-05-10 ENCOUNTER — TELEPHONE (OUTPATIENT)
Dept: FAMILY MEDICINE CLINIC | Facility: CLINIC | Age: 67
End: 2022-05-10

## 2022-05-10 NOTE — TELEPHONE ENCOUNTER
----- Message from Jose G Osborne MD sent at 5/10/2022  8:10 AM CDT -----  Sugars very high - have him continue to watch carbs and sweets in diet  Followup with me in 4 wks to further review  Rest of labs look good

## 2022-06-06 PROBLEM — G47.33 OSA (OBSTRUCTIVE SLEEP APNEA): Status: ACTIVE | Noted: 2022-06-06

## 2022-06-06 PROBLEM — E78.2 MIXED HYPERLIPIDEMIA: Status: ACTIVE | Noted: 2022-06-06

## 2022-06-06 PROBLEM — R73.9 HYPERGLYCEMIA: Status: ACTIVE | Noted: 2022-06-06

## 2022-06-06 PROBLEM — I10 ESSENTIAL HYPERTENSION: Status: ACTIVE | Noted: 2022-06-06

## 2022-06-29 ENCOUNTER — OFFICE VISIT (OUTPATIENT)
Dept: FAMILY MEDICINE CLINIC | Facility: CLINIC | Age: 67
End: 2022-06-29
Payer: MEDICARE

## 2022-06-29 VITALS
WEIGHT: 237 LBS | BODY MASS INDEX: 36.34 KG/M2 | TEMPERATURE: 97 F | HEART RATE: 66 BPM | DIASTOLIC BLOOD PRESSURE: 60 MMHG | OXYGEN SATURATION: 97 % | HEIGHT: 67.6 IN | SYSTOLIC BLOOD PRESSURE: 100 MMHG

## 2022-06-29 DIAGNOSIS — E11.9 TYPE 2 DIABETES MELLITUS WITHOUT COMPLICATION, WITHOUT LONG-TERM CURRENT USE OF INSULIN (HCC): ICD-10-CM

## 2022-06-29 DIAGNOSIS — R21 RASH: ICD-10-CM

## 2022-06-29 DIAGNOSIS — E66.01 MORBID (SEVERE) OBESITY DUE TO EXCESS CALORIES (HCC): ICD-10-CM

## 2022-06-29 DIAGNOSIS — I10 ESSENTIAL HYPERTENSION: ICD-10-CM

## 2022-06-29 DIAGNOSIS — Z12.11 ENCOUNTER FOR SCREENING FOR MALIGNANT NEOPLASM OF COLON: Primary | ICD-10-CM

## 2022-06-29 LAB
CREAT UR-SCNC: 137 MG/DL
MICROALBUMIN UR-MCNC: 1.3 MG/DL
MICROALBUMIN/CREAT 24H UR-RTO: 9.5 UG/MG (ref ?–30)

## 2022-06-29 PROCEDURE — 3008F BODY MASS INDEX DOCD: CPT | Performed by: FAMILY MEDICINE

## 2022-06-29 PROCEDURE — 99215 OFFICE O/P EST HI 40 MIN: CPT | Performed by: FAMILY MEDICINE

## 2022-06-29 PROCEDURE — 3061F NEG MICROALBUMINURIA REV: CPT | Performed by: FAMILY MEDICINE

## 2022-06-29 PROCEDURE — 82043 UR ALBUMIN QUANTITATIVE: CPT | Performed by: FAMILY MEDICINE

## 2022-06-29 PROCEDURE — 3074F SYST BP LT 130 MM HG: CPT | Performed by: FAMILY MEDICINE

## 2022-06-29 PROCEDURE — 3078F DIAST BP <80 MM HG: CPT | Performed by: FAMILY MEDICINE

## 2022-06-29 PROCEDURE — 82570 ASSAY OF URINE CREATININE: CPT | Performed by: FAMILY MEDICINE

## 2022-06-29 RX ORDER — LANCETS 28 GAUGE
1 EACH MISCELLANEOUS DAILY
Qty: 100 EACH | Refills: 0 | Status: SHIPPED | OUTPATIENT
Start: 2022-06-29 | End: 2023-06-29

## 2022-06-29 RX ORDER — KETOCONAZOLE 20 MG/G
CREAM TOPICAL
Qty: 60 G | Refills: 0 | Status: SHIPPED | OUTPATIENT
Start: 2022-06-29

## 2022-06-29 RX ORDER — BLOOD-GLUCOSE METER
1 KIT MISCELLANEOUS DAILY
Qty: 1 KIT | Refills: 0 | Status: SHIPPED | OUTPATIENT
Start: 2022-06-29 | End: 2023-06-29

## 2022-06-29 RX ORDER — DUPILUMAB 300 MG/2ML
300 INJECTION, SOLUTION SUBCUTANEOUS
COMMUNITY

## 2022-06-29 RX ORDER — BLOOD-GLUCOSE METER
KIT MISCELLANEOUS
Qty: 100 STRIP | Refills: 0 | Status: SHIPPED | OUTPATIENT
Start: 2022-06-29

## 2022-06-29 NOTE — PATIENT INSTRUCTIONS
Check fasting sugar 2-3x/day and as needed    Call to schedule appt with eye doctor    Consider repeat colonoscopy as you are due  Otherwise would recommend stool test for now, and consider colonoscopy in future    Followup in 3 months, sooner if needed

## 2022-07-01 RX ORDER — LOSARTAN POTASSIUM 100 MG/1
100 TABLET ORAL DAILY
Qty: 90 TABLET | Refills: 0 | Status: SHIPPED | OUTPATIENT
Start: 2022-07-01

## 2022-07-01 RX ORDER — ATORVASTATIN CALCIUM 40 MG/1
40 TABLET, FILM COATED ORAL NIGHTLY
Qty: 90 TABLET | Refills: 0 | Status: SHIPPED | OUTPATIENT
Start: 2022-07-01

## 2022-07-01 RX ORDER — PRAMIPEXOLE DIHYDROCHLORIDE 0.25 MG/1
0.25 TABLET ORAL DAILY
Qty: 30 TABLET | Refills: 0 | Status: SHIPPED | OUTPATIENT
Start: 2022-07-01

## 2022-07-01 RX ORDER — HYDROCHLOROTHIAZIDE 25 MG/1
TABLET ORAL
Qty: 90 TABLET | Refills: 0 | Status: SHIPPED | OUTPATIENT
Start: 2022-07-01

## 2022-07-29 RX ORDER — PRAMIPEXOLE DIHYDROCHLORIDE 0.25 MG/1
0.25 TABLET ORAL DAILY
Qty: 90 TABLET | Refills: 0 | Status: SHIPPED | OUTPATIENT
Start: 2022-07-29

## 2022-08-12 ENCOUNTER — PATIENT MESSAGE (OUTPATIENT)
Dept: FAMILY MEDICINE CLINIC | Facility: CLINIC | Age: 67
End: 2022-08-12

## 2022-08-12 DIAGNOSIS — R21 SKIN RASH: Primary | ICD-10-CM

## 2022-08-12 NOTE — TELEPHONE ENCOUNTER
From: Taya Phelan  To: Malu Villagomez MD  Sent: 8/12/2022 6:03 AM CDT  Subject: Renewed Ref for Yara Martin need another referral for Smith Goldberg. ... 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 ... I'll be seeing Colletta Halter in about three weeks from now.

## 2022-08-30 PROBLEM — K57.30 DIVERTICULOSIS OF COLON: Status: ACTIVE | Noted: 2022-08-30

## 2022-08-30 PROBLEM — D12.2 BENIGN NEOPLASM OF ASCENDING COLON: Status: ACTIVE | Noted: 2022-08-30

## 2022-08-30 PROBLEM — Z12.11 SPECIAL SCREENING FOR MALIGNANT NEOPLASM OF COLON: Status: ACTIVE | Noted: 2022-08-30

## 2022-08-30 PROBLEM — K63.5 COLON POLYP: Status: ACTIVE | Noted: 2022-08-30

## 2022-08-30 PROCEDURE — 88305 TISSUE EXAM BY PATHOLOGIST: CPT | Performed by: STUDENT IN AN ORGANIZED HEALTH CARE EDUCATION/TRAINING PROGRAM

## 2022-09-09 ENCOUNTER — PATIENT MESSAGE (OUTPATIENT)
Dept: FAMILY MEDICINE CLINIC | Facility: CLINIC | Age: 67
End: 2022-09-09

## 2022-09-12 ENCOUNTER — PATIENT MESSAGE (OUTPATIENT)
Dept: FAMILY MEDICINE CLINIC | Facility: CLINIC | Age: 67
End: 2022-09-12

## 2022-09-12 ENCOUNTER — OFFICE VISIT (OUTPATIENT)
Dept: FAMILY MEDICINE CLINIC | Facility: CLINIC | Age: 67
End: 2022-09-12
Payer: MEDICARE

## 2022-09-12 VITALS
TEMPERATURE: 98 F | OXYGEN SATURATION: 98 % | DIASTOLIC BLOOD PRESSURE: 60 MMHG | WEIGHT: 239 LBS | HEIGHT: 67.6 IN | HEART RATE: 55 BPM | SYSTOLIC BLOOD PRESSURE: 110 MMHG | BODY MASS INDEX: 36.64 KG/M2

## 2022-09-12 DIAGNOSIS — E11.9 TYPE 2 DIABETES MELLITUS WITHOUT COMPLICATION, WITHOUT LONG-TERM CURRENT USE OF INSULIN (HCC): Primary | ICD-10-CM

## 2022-09-12 DIAGNOSIS — E66.01 MORBID (SEVERE) OBESITY DUE TO EXCESS CALORIES (HCC): ICD-10-CM

## 2022-09-12 DIAGNOSIS — I10 ESSENTIAL HYPERTENSION: ICD-10-CM

## 2022-09-12 DIAGNOSIS — G47.33 OSA (OBSTRUCTIVE SLEEP APNEA): ICD-10-CM

## 2022-09-12 PROCEDURE — 3074F SYST BP LT 130 MM HG: CPT | Performed by: FAMILY MEDICINE

## 2022-09-12 PROCEDURE — 3078F DIAST BP <80 MM HG: CPT | Performed by: FAMILY MEDICINE

## 2022-09-12 PROCEDURE — 3008F BODY MASS INDEX DOCD: CPT | Performed by: FAMILY MEDICINE

## 2022-09-12 PROCEDURE — 99214 OFFICE O/P EST MOD 30 MIN: CPT | Performed by: FAMILY MEDICINE

## 2022-09-12 RX ORDER — CYCLOSPORINE 0.5 MG/ML
1 EMULSION OPHTHALMIC 2 TIMES DAILY
COMMUNITY
Start: 2022-07-11

## 2022-09-12 NOTE — TELEPHONE ENCOUNTER
From: Melvina Martinez  To: Edgardo Lucia MD  Sent: 9/9/2022 4:05 PM CDT  Subject: [facility] Referral - Please Fax authorization to Andres Jr    Please Fax the approved referral to the [facility] office  Fax Number [phone]    May appointment is on 09/13/2022

## 2022-09-12 NOTE — PATIENT INSTRUCTIONS
Go to [address] [place] [address] lab for fasting bloodwork - schedule this for November    Continue all other meds as prescribed    Continue to work on diet changes    Increase exercise     Followup with me in May for your next wellness, sooner if needed    Update us on mychart with your last pneumonia shot (date and type)

## 2022-09-13 NOTE — TELEPHONE ENCOUNTER
From: Janay Sanches  To: Peyton Pal MD  Sent: 9/12/2022 12:28 PM CDT  Subject: My last pneumonia vax    Here is the info regarding my last shot. ..     PNE #2  PNEUMOVAX 23  11/05/2020  Provider: Gissel More  Manufacture: MSD  Lot: #[id number]

## 2022-10-11 ENCOUNTER — TELEPHONE (OUTPATIENT)
Dept: FAMILY MEDICINE CLINIC | Facility: CLINIC | Age: 67
End: 2022-10-11

## 2022-10-11 DIAGNOSIS — E11.9 TYPE 2 DIABETES MELLITUS WITHOUT COMPLICATION, WITHOUT LONG-TERM CURRENT USE OF INSULIN (HCC): Primary | ICD-10-CM

## 2022-10-11 NOTE — TELEPHONE ENCOUNTER
Received fax requesting refill on Accu-check supplies. I'm not sure he is using accu-check. Left VM for him to call.

## 2022-10-12 ENCOUNTER — PATIENT MESSAGE (OUTPATIENT)
Dept: FAMILY MEDICINE CLINIC | Facility: CLINIC | Age: 67
End: 2022-10-12

## 2022-10-12 DIAGNOSIS — E11.9 TYPE 2 DIABETES MELLITUS WITHOUT COMPLICATION, WITHOUT LONG-TERM CURRENT USE OF INSULIN (HCC): Primary | ICD-10-CM

## 2022-10-12 NOTE — TELEPHONE ENCOUNTER
From: Elvia Mcneill  To: Tosin Albright MD  Sent: 10/12/2022 8:48 AM CDT  Subject: [facility] please    Dr Bernie Bullard had referred me to Dr Kat Raymundo (July 2022) regarding my eye lid pain. ... Dr Kat Raymundo had prescribed the use of Restasis. ... However, it doesn't seem to make any difference. The symptoms seem to be related to CARLOS.     Eye lid pain radiating from inner corner (Caruncle), inflamed tear production gland (Lacrimal)    Can you please refer me to a CARLOS specialist?

## 2022-10-14 RX ORDER — LANCETS
1 EACH MISCELLANEOUS DAILY
Qty: 100 EACH | Refills: 1 | Status: SHIPPED | OUTPATIENT
Start: 2022-10-14 | End: 2023-10-14

## 2022-10-14 RX ORDER — BLOOD SUGAR DIAGNOSTIC
1 STRIP MISCELLANEOUS DAILY
Qty: 100 STRIP | Refills: 1 | Status: SHIPPED | OUTPATIENT
Start: 2022-10-14

## 2022-10-19 ENCOUNTER — TELEPHONE (OUTPATIENT)
Dept: FAMILY MEDICINE CLINIC | Facility: CLINIC | Age: 67
End: 2022-10-19

## 2022-10-19 ENCOUNTER — PATIENT MESSAGE (OUTPATIENT)
Dept: FAMILY MEDICINE CLINIC | Facility: CLINIC | Age: 67
End: 2022-10-19

## 2022-10-19 NOTE — TELEPHONE ENCOUNTER
Pt called requesting to have referral for Dr.Oppenheims faxed over to that office. Pt did not have a fax number.  Pt has a upcoming apt with Dr.Oppenheims on 10/24

## 2022-10-21 RX ORDER — HYDROCHLOROTHIAZIDE 25 MG/1
TABLET ORAL
Qty: 90 TABLET | Refills: 1 | Status: SHIPPED | OUTPATIENT
Start: 2022-10-21

## 2022-10-21 RX ORDER — LOSARTAN POTASSIUM 100 MG/1
100 TABLET ORAL DAILY
Qty: 90 TABLET | Refills: 1 | Status: SHIPPED | OUTPATIENT
Start: 2022-10-21

## 2022-10-21 RX ORDER — ATORVASTATIN CALCIUM 40 MG/1
40 TABLET, FILM COATED ORAL NIGHTLY
Qty: 90 TABLET | Refills: 2 | Status: SHIPPED | OUTPATIENT
Start: 2022-10-21

## 2022-11-19 LAB
ALBUMIN/GLOBULIN RATIO: 1.9 (CALC) (ref 1–2.5)
ALBUMIN: 4.3 G/DL (ref 3.6–5.1)
ALKALINE PHOSPHATASE: 58 U/L (ref 35–144)
ALT: 31 U/L (ref 9–46)
AST: 28 U/L (ref 10–35)
BILIRUBIN, TOTAL: 0.6 MG/DL (ref 0.2–1.2)
BUN: 23 MG/DL (ref 7–25)
CALCIUM: 9.8 MG/DL (ref 8.6–10.3)
CARBON DIOXIDE: 30 MMOL/L (ref 20–32)
CHLORIDE: 101 MMOL/L (ref 98–110)
CREATININE: 1.15 MG/DL (ref 0.7–1.35)
EGFR: 70 ML/MIN/1.73M2
GLOBULIN: 2.3 G/DL (CALC) (ref 1.9–3.7)
GLUCOSE: 88 MG/DL (ref 65–99)
HEMOGLOBIN A1C: 6.5 % OF TOTAL HGB
POTASSIUM: 4.1 MMOL/L (ref 3.5–5.3)
PROTEIN, TOTAL: 6.6 G/DL (ref 6.1–8.1)
SODIUM: 139 MMOL/L (ref 135–146)

## 2022-12-14 ENCOUNTER — PATIENT MESSAGE (OUTPATIENT)
Dept: FAMILY MEDICINE CLINIC | Facility: CLINIC | Age: 67
End: 2022-12-14

## 2022-12-16 NOTE — TELEPHONE ENCOUNTER
From: Ra Jones  To: Roger Oneill MD  Sent: 12/14/2022 7:19 AM CST  Subject: Vaccine Updates    I received the following two Vaccines    1) Covid Booster, Moderna 12+  2) Fluad 65+ quad pf [phone]

## 2023-04-08 RX ORDER — PRAMIPEXOLE DIHYDROCHLORIDE 0.25 MG/1
0.25 TABLET ORAL DAILY
Qty: 90 TABLET | Refills: 0 | Status: SHIPPED | OUTPATIENT
Start: 2023-04-08

## 2023-05-01 ENCOUNTER — OFFICE VISIT (OUTPATIENT)
Dept: FAMILY MEDICINE CLINIC | Facility: CLINIC | Age: 68
End: 2023-05-01
Payer: MEDICARE

## 2023-05-01 VITALS
OXYGEN SATURATION: 98 % | WEIGHT: 237 LBS | HEART RATE: 62 BPM | SYSTOLIC BLOOD PRESSURE: 128 MMHG | HEIGHT: 67.72 IN | DIASTOLIC BLOOD PRESSURE: 78 MMHG | TEMPERATURE: 98 F | BODY MASS INDEX: 36.34 KG/M2

## 2023-05-01 DIAGNOSIS — Z11.59 NEED FOR HEPATITIS C SCREENING TEST: ICD-10-CM

## 2023-05-01 DIAGNOSIS — G47.33 OSA (OBSTRUCTIVE SLEEP APNEA): ICD-10-CM

## 2023-05-01 DIAGNOSIS — M25.561 CHRONIC PAIN OF RIGHT KNEE: ICD-10-CM

## 2023-05-01 DIAGNOSIS — E66.01 MORBID (SEVERE) OBESITY DUE TO EXCESS CALORIES (HCC): ICD-10-CM

## 2023-05-01 DIAGNOSIS — G89.29 CHRONIC PAIN OF RIGHT KNEE: ICD-10-CM

## 2023-05-01 DIAGNOSIS — I10 ESSENTIAL HYPERTENSION: ICD-10-CM

## 2023-05-01 DIAGNOSIS — Z00.00 ENCOUNTER FOR ANNUAL HEALTH EXAMINATION: Primary | ICD-10-CM

## 2023-05-01 DIAGNOSIS — Z12.5 SCREENING FOR MALIGNANT NEOPLASM OF PROSTATE: ICD-10-CM

## 2023-05-01 DIAGNOSIS — E11.9 TYPE 2 DIABETES MELLITUS WITHOUT COMPLICATION, WITHOUT LONG-TERM CURRENT USE OF INSULIN (HCC): ICD-10-CM

## 2023-05-01 DIAGNOSIS — E78.2 MIXED HYPERLIPIDEMIA: ICD-10-CM

## 2023-05-01 DIAGNOSIS — L20.89 OTHER ATOPIC DERMATITIS: ICD-10-CM

## 2023-05-01 DIAGNOSIS — M25.551 RIGHT HIP PAIN: ICD-10-CM

## 2023-05-01 LAB
CARTRIDGE LOT#: 573 NUMERIC
CREAT UR-SCNC: 94.6 MG/DL
HEMOGLOBIN A1C: 6 % (ref 4.3–5.6)
MICROALBUMIN UR-MCNC: 2.62 MG/DL
MICROALBUMIN/CREAT 24H UR-RTO: 27.7 UG/MG (ref ?–30)

## 2023-05-01 PROCEDURE — 82570 ASSAY OF URINE CREATININE: CPT | Performed by: FAMILY MEDICINE

## 2023-05-01 PROCEDURE — 82043 UR ALBUMIN QUANTITATIVE: CPT | Performed by: FAMILY MEDICINE

## 2023-05-01 RX ORDER — UPADACITINIB 15 MG/1
15 TABLET, EXTENDED RELEASE ORAL DAILY
COMMUNITY

## 2023-05-02 ENCOUNTER — TELEPHONE (OUTPATIENT)
Dept: FAMILY MEDICINE CLINIC | Facility: CLINIC | Age: 68
End: 2023-05-02

## 2023-05-02 NOTE — TELEPHONE ENCOUNTER
Called [facility] and spoke with staff. Staff Informed me that the patient was not checked for retinopathy. They did not know patient was diabetic. They will add diabetes to the chart and dilate him next time he goes to office and they will send us the consult report.

## 2023-05-02 NOTE — TELEPHONE ENCOUNTER
Robert Strauss returning call from [facility]. Robert Strauss is calling from [facility] providing information regarding diabetic eye exam.    Robert Strauss is going to be gone for the day and has informed Jamey Cogan at her office regarding the message.

## 2023-05-15 ENCOUNTER — HOSPITAL ENCOUNTER (OUTPATIENT)
Dept: GENERAL RADIOLOGY | Age: 68
Discharge: HOME | End: 2023-05-15
Attending: FAMILY MEDICINE
Payer: MEDICARE

## 2023-05-15 DIAGNOSIS — M25.561 CHRONIC PAIN OF RIGHT KNEE: ICD-10-CM

## 2023-05-15 DIAGNOSIS — G89.29 CHRONIC PAIN OF RIGHT KNEE: ICD-10-CM

## 2023-05-15 PROCEDURE — 73562 X-RAY EXAM OF KNEE 3: CPT | Performed by: FAMILY MEDICINE

## 2023-05-18 ENCOUNTER — PATIENT MESSAGE (OUTPATIENT)
Dept: FAMILY MEDICINE CLINIC | Facility: CLINIC | Age: 68
End: 2023-05-18

## 2023-05-18 DIAGNOSIS — E78.2 MIXED HYPERLIPIDEMIA: Primary | ICD-10-CM

## 2023-05-18 LAB
ABSOLUTE BASOPHILS: 10 CELLS/UL (ref 0–200)
ABSOLUTE EOSINOPHILS: 113 CELLS/UL (ref 15–500)
ABSOLUTE LYMPHOCYTES: 1651 CELLS/UL (ref 850–3900)
ABSOLUTE MONOCYTES: 421 CELLS/UL (ref 200–950)
ABSOLUTE NEUTROPHILS: 2705 CELLS/UL (ref 1500–7800)
ALBUMIN/GLOBULIN RATIO: 2 (CALC) (ref 1–2.5)
ALBUMIN: 4.3 G/DL (ref 3.6–5.1)
ALKALINE PHOSPHATASE: 52 U/L (ref 35–144)
ALT: 39 U/L (ref 9–46)
AST: 34 U/L (ref 10–35)
BASOPHILS: 0.2 %
BILIRUBIN, TOTAL: 0.5 MG/DL (ref 0.2–1.2)
BUN: 25 MG/DL (ref 7–25)
CALCIUM: 9.6 MG/DL (ref 8.6–10.3)
CARBON DIOXIDE: 25 MMOL/L (ref 20–32)
CHLORIDE: 106 MMOL/L (ref 98–110)
CHOL/HDLC RATIO: 4.3 (CALC)
CHOLESTEROL, TOTAL: 232 MG/DL
CREATININE: 1.11 MG/DL (ref 0.7–1.35)
EGFR: 73 ML/MIN/1.73M2
EOSINOPHILS: 2.3 %
GLOBULIN: 2.1 G/DL (CALC) (ref 1.9–3.7)
GLUCOSE: 128 MG/DL (ref 65–139)
HDL CHOLESTEROL: 54 MG/DL
HEMATOCRIT: 41.7 % (ref 38.5–50)
HEMOGLOBIN: 14.4 G/DL (ref 13.2–17.1)
LYMPHOCYTES: 33.7 %
MCH: 31.4 PG (ref 27–33)
MCHC: 34.5 G/DL (ref 32–36)
MCV: 91 FL (ref 80–100)
MONOCYTES: 8.6 %
MPV: 11.3 FL (ref 7.5–12.5)
NEUTROPHILS: 55.2 %
NON-HDL CHOLESTEROL: 178 MG/DL (CALC)
PLATELET COUNT: 223 THOUSAND/UL (ref 140–400)
POTASSIUM: 4.4 MMOL/L (ref 3.5–5.3)
PROTEIN, TOTAL: 6.4 G/DL (ref 6.1–8.1)
RDW: 14.5 % (ref 11–15)
RED BLOOD CELL COUNT: 4.58 MILLION/UL (ref 4.2–5.8)
SIGNAL TO CUT-OFF: 0.09
SODIUM: 138 MMOL/L (ref 135–146)
TOTAL PSA: 0.7 NG/ML
TRIGLYCERIDES: 484 MG/DL
TSH W/REFLEX TO FT4: 1.16 MIU/L (ref 0.4–4.5)
WHITE BLOOD CELL COUNT: 4.9 THOUSAND/UL (ref 3.8–10.8)

## 2023-05-19 NOTE — TELEPHONE ENCOUNTER
From: Mark Sierra  To: Brittny Watson MD  Sent: 5/18/2023  3:26 PM CDT  Subject: cholesterol test results     Looks like I need to go back onto a statin. Please prescribe one with the least risk of raising my AC1    ___________________________________________    Component      Latest Ref Rng 5/17/2023   Cholesterol, Total      <200 mg/dL 232 (H)    HDL Cholesterol      > OR = 40 mg/dL 54    Triglycerides      <150 mg/dL 484 (H)    LDL Cholesterol Calc      mg/dL (calc) --    Chol/HDL Ratio      <5.0 (calc) 4.3    NON-HDL CHOLESTEROL      <130 mg/dL (calc) 178 (H)         HCV antibody, PSA, TSH, CMP and CBC results also available.

## 2023-05-23 RX ORDER — SIMVASTATIN 20 MG
20 TABLET ORAL NIGHTLY
Qty: 90 TABLET | Refills: 1 | Status: SHIPPED | OUTPATIENT
Start: 2023-05-23

## 2023-05-31 ENCOUNTER — TELEPHONE (OUTPATIENT)
Dept: FAMILY MEDICINE CLINIC | Facility: CLINIC | Age: 68
End: 2023-05-31

## 2023-05-31 ENCOUNTER — HOSPITAL ENCOUNTER (OUTPATIENT)
Dept: GENERAL RADIOLOGY | Age: 68
Discharge: HOME | End: 2023-05-31
Attending: FAMILY MEDICINE
Payer: MEDICARE

## 2023-05-31 DIAGNOSIS — G89.29 CHRONIC PAIN OF RIGHT KNEE: ICD-10-CM

## 2023-05-31 DIAGNOSIS — M25.561 CHRONIC PAIN OF RIGHT KNEE: ICD-10-CM

## 2023-05-31 PROCEDURE — 73565 X-RAY EXAM OF KNEES: CPT | Performed by: FAMILY MEDICINE

## 2023-05-31 RX ORDER — LATANOPROST 50 UG/ML
1 SOLUTION/ DROPS OPHTHALMIC NIGHTLY
COMMUNITY
Start: 2023-05-03

## 2023-05-31 RX ORDER — SIMVASTATIN 20 MG
20 TABLET ORAL NIGHTLY
Qty: 90 TABLET | Refills: 1 | Status: SHIPPED | OUTPATIENT
Start: 2023-05-31

## 2023-08-17 ENCOUNTER — LAB ENCOUNTER (OUTPATIENT)
Dept: LAB | Age: 68
End: 2023-08-17
Attending: FAMILY MEDICINE
Payer: COMMERCIAL

## 2023-08-17 DIAGNOSIS — E78.2 MIXED HYPERLIPIDEMIA: ICD-10-CM

## 2023-08-17 PROCEDURE — 80061 LIPID PANEL: CPT

## 2023-08-17 PROCEDURE — 36415 COLL VENOUS BLD VENIPUNCTURE: CPT

## 2023-08-17 PROCEDURE — 80053 COMPREHEN METABOLIC PANEL: CPT

## 2023-08-18 LAB
ALBUMIN SERPL-MCNC: 3.9 G/DL (ref 3.4–5)
ALBUMIN/GLOB SERPL: 1.2 {RATIO} (ref 1–2)
ALP LIVER SERPL-CCNC: 48 U/L
ALT SERPL-CCNC: 56 U/L
ANION GAP SERPL CALC-SCNC: 7 MMOL/L (ref 0–18)
AST SERPL-CCNC: 36 U/L (ref 15–37)
BILIRUB SERPL-MCNC: 0.6 MG/DL (ref 0.1–2)
BUN BLD-MCNC: 24 MG/DL (ref 7–18)
CALCIUM BLD-MCNC: 9 MG/DL (ref 8.5–10.1)
CHLORIDE SERPL-SCNC: 106 MMOL/L (ref 98–112)
CHOLEST SERPL-MCNC: 154 MG/DL (ref ?–200)
CO2 SERPL-SCNC: 26 MMOL/L (ref 21–32)
CREAT BLD-MCNC: 1.22 MG/DL
EGFRCR SERPLBLD CKD-EPI 2021: 65 ML/MIN/1.73M2 (ref 60–?)
FASTING PATIENT LIPID ANSWER: NO
FASTING STATUS PATIENT QL REPORTED: NO
GLOBULIN PLAS-MCNC: 3.3 G/DL (ref 2.8–4.4)
GLUCOSE BLD-MCNC: 174 MG/DL (ref 70–99)
HDLC SERPL-MCNC: 58 MG/DL (ref 40–59)
LDLC SERPL CALC-MCNC: 57 MG/DL (ref ?–100)
NONHDLC SERPL-MCNC: 96 MG/DL (ref ?–130)
OSMOLALITY SERPL CALC.SUM OF ELEC: 296 MOSM/KG (ref 275–295)
POTASSIUM SERPL-SCNC: 4 MMOL/L (ref 3.5–5.1)
PROT SERPL-MCNC: 7.2 G/DL (ref 6.4–8.2)
SODIUM SERPL-SCNC: 139 MMOL/L (ref 136–145)
TRIGL SERPL-MCNC: 250 MG/DL (ref 30–149)
VLDLC SERPL CALC-MCNC: 37 MG/DL (ref 0–30)

## 2023-08-28 RX ORDER — LOSARTAN POTASSIUM 100 MG/1
100 TABLET ORAL DAILY
Qty: 90 TABLET | Refills: 0 | Status: SHIPPED | OUTPATIENT
Start: 2023-08-28

## 2023-08-28 NOTE — TELEPHONE ENCOUNTER
Pt requesting refill of LOSARTAN POTASSIUM 100 MG Tablet     Last Time Medication was Prescribed :  10/21/2022    Last Office Visit with Provider: 05/01/2023    Recommended to return by Provider: Followup in 6 months, sooner prn     No future appointments.      Passed protocol, refill approved, sent to pharmacy    Hypertension Medications Protocol Passed

## 2023-09-19 ENCOUNTER — TELEPHONE (OUTPATIENT)
Dept: FAMILY MEDICINE CLINIC | Facility: CLINIC | Age: 68
End: 2023-09-19

## 2023-09-19 NOTE — TELEPHONE ENCOUNTER
Reached patient for medication adherence consult. Patient overdue for refill on losartan. Patient recently refilled losartan on 8/28 x 90 day supply. Patient reports that he has been taking his medication as prescribed without missing doses. Patient reports that he is tolerating his medication well. He denies the need for refill at this time. Provided education on importance of adherence for optimal benefit. Patient denies any questions or concerns with medication.

## 2023-10-10 ENCOUNTER — OFFICE VISIT (OUTPATIENT)
Dept: FAMILY MEDICINE CLINIC | Facility: CLINIC | Age: 68
End: 2023-10-10
Payer: MEDICARE

## 2023-10-10 VITALS
BODY MASS INDEX: 35.42 KG/M2 | SYSTOLIC BLOOD PRESSURE: 132 MMHG | HEIGHT: 67.72 IN | OXYGEN SATURATION: 97 % | WEIGHT: 231 LBS | TEMPERATURE: 97 F | DIASTOLIC BLOOD PRESSURE: 70 MMHG | HEART RATE: 88 BPM

## 2023-10-10 DIAGNOSIS — H57.13 EYE PAIN, BILATERAL: ICD-10-CM

## 2023-10-10 DIAGNOSIS — H57.89 EYE SWELLING, BILATERAL: ICD-10-CM

## 2023-10-10 DIAGNOSIS — E11.9 TYPE 2 DIABETES MELLITUS WITHOUT COMPLICATION, WITHOUT LONG-TERM CURRENT USE OF INSULIN (HCC): Primary | ICD-10-CM

## 2023-10-10 DIAGNOSIS — I10 ESSENTIAL HYPERTENSION: ICD-10-CM

## 2023-10-10 DIAGNOSIS — E78.2 MIXED HYPERLIPIDEMIA: ICD-10-CM

## 2023-10-10 PROBLEM — G20.A1 PARKINSON'S DISEASE (HCC): Status: ACTIVE | Noted: 2023-10-10

## 2023-10-10 PROBLEM — G20.A1 PARKINSON'S DISEASE: Status: ACTIVE | Noted: 2023-10-10

## 2023-10-10 LAB
CARTRIDGE LOT#: 607 NUMERIC
HEMOGLOBIN A1C: 5.8 % (ref 4.3–5.6)

## 2023-10-10 NOTE — PATIENT INSTRUCTIONS
Stop losartan    Check BP at home    Update with readings and how eyes are doing in about 4-6 wks - sooner if BP is consistently > 160/100    Followup with [facility] if symptoms not improving    Continue with diet and exercise changes    Followup in May as planned, sooner if needed

## 2023-10-18 ENCOUNTER — PATIENT MESSAGE (OUTPATIENT)
Dept: FAMILY MEDICINE CLINIC | Facility: CLINIC | Age: 68
End: 2023-10-18

## 2023-10-18 NOTE — TELEPHONE ENCOUNTER
From: Ap Harrison  To: Amaya Fish  Sent: 10/18/2023 9:58 AM CDT  Subject: vaccinations    I received two vaccinations 10/18/2023  Spikevax 12+ (COVID) 23-24 PFS  FLUAD 65+ PF INJ 2023-240.5ML

## 2023-11-20 ENCOUNTER — TELEPHONE (OUTPATIENT)
Dept: FAMILY MEDICINE CLINIC | Facility: CLINIC | Age: 68
End: 2023-11-20

## 2023-11-20 NOTE — TELEPHONE ENCOUNTER
Patient states Saturday night his right elbow started hurting. It is red, swollen, and warm to touch. He did not bump it. He is requesting an appointment. Discussed with provider in the office since Dr. Roque Mckinnon is out of the office. She recommends IC. Phoned patient and informed him. He VU and is in agreement.

## 2023-11-20 NOTE — TELEPHONE ENCOUNTER
Patient called ans stated that he hurt his elbow on Saturday 11/18. Patient was requesting an appt for Dr. Samantha Ayala for this week but  is not in the office. No other appts available for this week. Patient is requesting advice.

## 2023-11-27 RX ORDER — LOSARTAN POTASSIUM 100 MG/1
100 TABLET ORAL DAILY
Qty: 90 TABLET | Refills: 0 | Status: SHIPPED | OUTPATIENT
Start: 2023-11-27

## 2024-01-18 RX ORDER — SIMVASTATIN 20 MG
20 TABLET ORAL NIGHTLY
Qty: 90 TABLET | Refills: 3 | Status: SHIPPED | OUTPATIENT
Start: 2024-01-18

## 2024-01-18 NOTE — TELEPHONE ENCOUNTER
Requested Prescriptions     Signed Prescriptions Disp Refills    SIMVASTATIN 20 MG Oral Tab 90 tablet 3     Sig: TAKE 1 TABLET EVERY NIGHT     Authorizing Provider: TOM CID     Ordering User: CLEMENTE OVERTON     Refilled per protocol/OV notes

## 2024-04-11 ENCOUNTER — LAB ENCOUNTER (OUTPATIENT)
Dept: LAB | Age: 69
End: 2024-04-11
Payer: MEDICARE

## 2024-04-11 DIAGNOSIS — Z79.899 ENCOUNTER FOR LONG-TERM (CURRENT) DRUG USE: Primary | ICD-10-CM

## 2024-04-11 DIAGNOSIS — L20.89 PAPULAR ATOPIC DERMATITIS: ICD-10-CM

## 2024-04-11 LAB
ALBUMIN SERPL-MCNC: 3.7 G/DL (ref 3.4–5)
ALBUMIN/GLOB SERPL: 1.2 {RATIO} (ref 1–2)
ALP LIVER SERPL-CCNC: 50 U/L
ALT SERPL-CCNC: 62 U/L
ANION GAP SERPL CALC-SCNC: 6 MMOL/L (ref 0–18)
AST SERPL-CCNC: 42 U/L (ref 15–37)
BILIRUB SERPL-MCNC: 0.5 MG/DL (ref 0.1–2)
BUN BLD-MCNC: 25 MG/DL (ref 9–23)
CALCIUM BLD-MCNC: 9.2 MG/DL (ref 8.5–10.1)
CHLORIDE SERPL-SCNC: 108 MMOL/L (ref 98–112)
CHOLEST SERPL-MCNC: 139 MG/DL (ref ?–200)
CO2 SERPL-SCNC: 25 MMOL/L (ref 21–32)
CREAT BLD-MCNC: 1.17 MG/DL
EGFRCR SERPLBLD CKD-EPI 2021: 68 ML/MIN/1.73M2 (ref 60–?)
FASTING PATIENT LIPID ANSWER: NO
FASTING STATUS PATIENT QL REPORTED: NO
GLOBULIN PLAS-MCNC: 3.1 G/DL (ref 2.8–4.4)
GLUCOSE BLD-MCNC: 161 MG/DL (ref 70–99)
HDLC SERPL-MCNC: 55 MG/DL (ref 40–59)
LDLC SERPL CALC-MCNC: 44 MG/DL (ref ?–100)
NONHDLC SERPL-MCNC: 84 MG/DL (ref ?–130)
OSMOLALITY SERPL CALC.SUM OF ELEC: 296 MOSM/KG (ref 275–295)
POTASSIUM SERPL-SCNC: 4.7 MMOL/L (ref 3.5–5.1)
PROT SERPL-MCNC: 6.8 G/DL (ref 6.4–8.2)
SODIUM SERPL-SCNC: 139 MMOL/L (ref 136–145)
TRIGL SERPL-MCNC: 260 MG/DL (ref 30–149)
VLDLC SERPL CALC-MCNC: 36 MG/DL (ref 0–30)

## 2024-04-11 PROCEDURE — 80053 COMPREHEN METABOLIC PANEL: CPT

## 2024-04-11 PROCEDURE — 80061 LIPID PANEL: CPT

## 2024-04-11 PROCEDURE — 36415 COLL VENOUS BLD VENIPUNCTURE: CPT

## 2024-04-11 PROCEDURE — 86480 TB TEST CELL IMMUN MEASURE: CPT

## 2024-04-14 LAB
M TB IFN-G CD4+ T-CELLS BLD-ACNC: 0.05 IU/ML
M TB TUBERC IFN-G BLD QL: NEGATIVE
M TB TUBERC IGNF/MITOGEN IGNF CONTROL: >10 IU/ML
QFT TB1 AG MINUS NIL: -0.01 IU/ML
QFT TB2 AG MINUS NIL: 0 IU/ML

## 2024-04-15 RX ORDER — PRAMIPEXOLE DIHYDROCHLORIDE 0.25 MG/1
0.25 TABLET ORAL DAILY
Qty: 90 TABLET | Refills: 3 | Status: SHIPPED | OUTPATIENT
Start: 2024-04-15

## 2024-05-07 ENCOUNTER — OFFICE VISIT (OUTPATIENT)
Dept: FAMILY MEDICINE CLINIC | Facility: CLINIC | Age: 69
End: 2024-05-07

## 2024-05-07 VITALS
TEMPERATURE: 98 F | SYSTOLIC BLOOD PRESSURE: 136 MMHG | DIASTOLIC BLOOD PRESSURE: 72 MMHG | OXYGEN SATURATION: 97 % | HEART RATE: 71 BPM | HEIGHT: 67.5 IN | BODY MASS INDEX: 36.36 KG/M2 | RESPIRATION RATE: 18 BRPM | WEIGHT: 234.38 LBS

## 2024-05-07 DIAGNOSIS — G47.33 OSA (OBSTRUCTIVE SLEEP APNEA): ICD-10-CM

## 2024-05-07 DIAGNOSIS — E11.9 TYPE 2 DIABETES MELLITUS WITHOUT COMPLICATION, WITHOUT LONG-TERM CURRENT USE OF INSULIN (HCC): ICD-10-CM

## 2024-05-07 DIAGNOSIS — E66.01 MORBID (SEVERE) OBESITY DUE TO EXCESS CALORIES (HCC): ICD-10-CM

## 2024-05-07 DIAGNOSIS — I10 ESSENTIAL HYPERTENSION: ICD-10-CM

## 2024-05-07 DIAGNOSIS — E78.2 MIXED HYPERLIPIDEMIA: ICD-10-CM

## 2024-05-07 DIAGNOSIS — Z00.00 ENCOUNTER FOR ANNUAL HEALTH EXAMINATION: Primary | ICD-10-CM

## 2024-05-07 DIAGNOSIS — Z86.69 HISTORY OF MIGRAINE: ICD-10-CM

## 2024-05-07 DIAGNOSIS — M79.672 LEFT FOOT PAIN: ICD-10-CM

## 2024-05-07 DIAGNOSIS — Z12.5 SCREENING FOR MALIGNANT NEOPLASM OF PROSTATE: ICD-10-CM

## 2024-05-07 LAB
CREAT UR-SCNC: 152 MG/DL
HEMOGLOBIN A1C: 5.9 % (ref 4.3–5.6)
MICROALBUMIN UR-MCNC: 6.18 MG/DL
MICROALBUMIN/CREAT 24H UR-RTO: 40.7 UG/MG (ref ?–30)

## 2024-05-07 PROCEDURE — 1170F FXNL STATUS ASSESSED: CPT | Performed by: FAMILY MEDICINE

## 2024-05-07 PROCEDURE — G0439 PPPS, SUBSEQ VISIT: HCPCS | Performed by: FAMILY MEDICINE

## 2024-05-07 PROCEDURE — 3044F HG A1C LEVEL LT 7.0%: CPT | Performed by: FAMILY MEDICINE

## 2024-05-07 PROCEDURE — 1125F AMNT PAIN NOTED PAIN PRSNT: CPT | Performed by: FAMILY MEDICINE

## 2024-05-07 PROCEDURE — 3008F BODY MASS INDEX DOCD: CPT | Performed by: FAMILY MEDICINE

## 2024-05-07 PROCEDURE — 3061F NEG MICROALBUMINURIA REV: CPT | Performed by: FAMILY MEDICINE

## 2024-05-07 PROCEDURE — 82570 ASSAY OF URINE CREATININE: CPT | Performed by: FAMILY MEDICINE

## 2024-05-07 PROCEDURE — 99215 OFFICE O/P EST HI 40 MIN: CPT | Performed by: FAMILY MEDICINE

## 2024-05-07 PROCEDURE — 82043 UR ALBUMIN QUANTITATIVE: CPT | Performed by: FAMILY MEDICINE

## 2024-05-07 PROCEDURE — 3078F DIAST BP <80 MM HG: CPT | Performed by: FAMILY MEDICINE

## 2024-05-07 PROCEDURE — 1159F MED LIST DOCD IN RCRD: CPT | Performed by: FAMILY MEDICINE

## 2024-05-07 PROCEDURE — 3075F SYST BP GE 130 - 139MM HG: CPT | Performed by: FAMILY MEDICINE

## 2024-05-07 PROCEDURE — 1160F RVW MEDS BY RX/DR IN RCRD: CPT | Performed by: FAMILY MEDICINE

## 2024-05-07 PROCEDURE — 96160 PT-FOCUSED HLTH RISK ASSMT: CPT | Performed by: FAMILY MEDICINE

## 2024-05-07 PROCEDURE — 3060F POS MICROALBUMINURIA REV: CPT | Performed by: FAMILY MEDICINE

## 2024-05-07 PROCEDURE — 83036 HEMOGLOBIN GLYCOSYLATED A1C: CPT | Performed by: FAMILY MEDICINE

## 2024-05-07 RX ORDER — DULOXETIN HYDROCHLORIDE 30 MG/1
30 CAPSULE, DELAYED RELEASE ORAL DAILY
Qty: 90 CAPSULE | Refills: 1 | Status: SHIPPED | OUTPATIENT
Start: 2024-05-07

## 2024-05-07 RX ORDER — FAMOTIDINE 20 MG/1
20 TABLET, FILM COATED ORAL 2 TIMES DAILY
Qty: 180 TABLET | Refills: 1 | Status: SHIPPED | OUTPATIENT
Start: 2024-05-07

## 2024-05-07 NOTE — PROGRESS NOTES
Subjective:   Jorge A Duffy is a 68 year old male who presents for a Medicare Initial Annual Wellness visit (Once after 12 month Medicare anniversary) .     1. Encounter for annual health examination  2. Need for hepatitis C screening test  -due for wellness    3. Type 2 diabetes mellitus without complication, without long-term current use of insulin (HCC)  8. Left foot pain  -sugars: good  -Last A1c value was 5.9% done 5/7/2024.    -medication issues: diet controlled  -last eye exam: 07/08/2022    -denies hyper or hypo glycemic symptoms  -complains of numbness in foot at night    4. Mixed hyperlipidemia  -off statin  -due for lipids    5. Essential hypertension  -at goal    6. ABBY (obstructive sleep apnea)  -stable not on cpap    7. Morbid (severe) obesity due to excess calories (HCC)  -trying to eat healthy    9. History of migraine  -complains of continued issues with migraines  -interested in seeing neuro        History/Other:   Fall Risk Assessment:   He has been screened for Falls and is low risk.      Cognitive Assessment:   He had a completely normal cognitive assessment - see flowsheet entries     Functional Ability/Status:   Jorge A Duffy has a completely normal functional assessment. See flowsheet for details.        Depression Screening (PHQ-2/PHQ-9): PHQ-9 TOTAL SCORE: 3  , done 5/7/2024   Feeling tired or having little energy: 3    If you checked off any problems, how difficult have these problems made it for you to do your work, take care of things at home, or get along with other people?: Not difficult at all       Advanced Directives:   He does NOT have a Living Will. [Do you have a living will?: No]  He does NOT have a Power of  for Health Care. [Do you have a healthcare power of ?: No]  Discussed Advance Care Planning with patient (and family/surrogate if present). Standard forms made available to patient in After Visit Summary.      Patient Active Problem List   Diagnosis     Essential hypertension    Mixed hyperlipidemia    Hyperglycemia    ABBY (obstructive sleep apnea)    Morbid (severe) obesity due to excess calories (HCC)    Special screening for malignant neoplasm of colon    Benign neoplasm of ascending colon    Colon polyp    Diverticulosis of colon    Type 2 diabetes mellitus without complication, without long-term current use of insulin (HCC)     Allergies:  He is allergic to triamcinolone.    Current Medications:  Outpatient Medications Marked as Taking for the 5/7/24 encounter (Office Visit) with Romeo Doan MD   Medication Sig    famotidine 20 MG Oral Tab Take 1 tablet (20 mg total) by mouth 2 (two) times daily.    DULoxetine 30 MG Oral Cap DR Particles Take 1 capsule (30 mg total) by mouth daily.    pramipexole 0.25 MG Oral Tab Take 1 tablet (0.25 mg total) by mouth daily.    SIMVASTATIN 20 MG Oral Tab TAKE 1 TABLET EVERY NIGHT    losartan 100 MG Oral Tab Take 1 tablet (100 mg total) by mouth daily.    latanoprost 0.005 % Ophthalmic Solution Apply 1 drop to eye nightly.    Upadacitinib ER (RINVOQ) 15 MG Oral Tablet 24 Hr Take 15 mg by mouth daily.    Alpha-Lipoic Acid 600 MG Oral Cap Take 1 capsule by mouth 2 (two) times a day.    Naproxen Sodium 220 MG Oral Cap Take 220 mg by mouth 2 (two) times a day.    Multiple Vitamin (MULTI-VITAMIN DAILY OR) Take by mouth.    Calcium Carbonate-Vit D-Min (CALCIUM 1200 OR) Take 1 capsule by mouth daily.    Glucosamine-Chondroit-Vit C-Mn (GLUCOSAMINE 1500 COMPLEX OR) Take 1 capsule by mouth daily.    B Complex Vitamins (B COMPLEX 100 OR) Take 1 tablet by mouth daily.       Medical History:  He  has a past medical history of Back pain (2013), Calculus of kidney (Oct / 2005), Itch of skin (Sept 2021), Rash, Sleep apnea (1995), Sleep disturbance (1995), and Wears glasses (2007).  Surgical History:  He  has a past surgical history that includes organ transplant (1989).   Family History:  His family history is not on file.  Social  History:  He  reports that he has never smoked. He has never used smokeless tobacco. He reports that he does not currently use alcohol after a past usage of about 1.0 standard drink of alcohol per week. He reports current drug use. Frequency: 2.00 times per week. Drug: Cannabis.    Tobacco:  He has never smoked tobacco.    CAGE Alcohol Screen:   He has been screened for alcohol abuse and his score is not 0:  Cut: Have you ever felt you should Cut down on your drinking?: Yes  Annoyed: Have people Annoyed you by criticizing your drinking?: Yes  Guilty: Have you ever felt bad or Guilty about your drinking?: Yes  Eye Opener: Have you ever had a drink first thing in the morning to steady your nerves or to get rid of a hangover (Eye opener)?: Yes  Total Score: 4      Patient Care Team:  Romeo Doan MD as PCP - General (Family Medicine)    Review of Systems     Negative except above    Objective:   Physical Exam  General Appearance:  Alert, cooperative, no distress, appears stated age   Head:  Normocephalic, without obvious abnormality, atraumatic   Eyes:  PERRL   Ears:  Normal TM's and external ear canals, both ears   Lungs:   Clear to auscultation bilaterally, respirations unlabored   Heart:  Regular rate and rhythm   Extremities: Extremities normal, no cyanosis or edema   Skin: Skin color, texture, turgor normal; follicular erythematous rash in legs and lower abdomen and buttocks   Neurologic: No appreciable abnormality   Bilateral barefoot skin diabetic exam is normal, visualized feet and the appearance is normal.  Bilateral monofilament/sensation of both feet is normal.  Pulsation pedal pulse exam of both lower legs/feet is normal as well.    /72 (BP Location: Left arm, Patient Position: Sitting, Cuff Size: adult)   Pulse 71   Temp 97.5 °F (36.4 °C) (Temporal)   Resp 18   Ht 5' 7.5\" (1.715 m)   Wt 234 lb 6.4 oz (106.3 kg)   SpO2 97%   BMI 36.17 kg/m²  Estimated body mass index is 36.17 kg/m² as  calculated from the following:    Height as of this encounter: 5' 7.5\" (1.715 m).    Weight as of this encounter: 234 lb 6.4 oz (106.3 kg).    Medicare Hearing Assessment:   Hearing Screening    Screening Method: Finger Rub  Finger Rub Result: Pass           Assessment & Plan:   Jorge A Duffy is a 68 year old male who presents for a Medicare Assessment.     1. Encounter for annual health examination  -reviewed age appropriate screening  -up to date on colonoscopy - due 2027  -check PSA    2. Screening for malignant neoplasm of prostate  - PSA Total, Screen; Future    3. Type 2 diabetes mellitus without complication, without long-term current use of insulin (HCC)  -at goal  -c/w labs    - Microalb/Creat Ratio, Random Urine; Future  - POC Hgb A1C  - Microalb/Creat Ratio, Random Urine    4. Left foot pain  -start duloxetine for neuropathy    5. Essential hypertension  -at goal  -c/w meds    6. Mixed hyperlipidemia  -recheck lipids    7. ABBY (obstructive sleep apnea)  -not able to tolerate cpap    8. Morbid (severe) obesity due to excess calories (HCC)  -c/w lifestyle changes    9. History of migraine  - Neuro Referral - In Network            The patient indicates understanding of these issues and agrees to the plan.  Reinforced healthy diet, lifestyle, and exercise.      Return in about 3 months (around 8/7/2024).     TOM CID MD, 5/4/2022     Supplementary Documentation:   General Health:  In the past six months, have you lost more than 10 pounds without trying?: 2 - No  Has your appetite been poor?: No  Type of Diet: Balanced  How does the patient maintain a good energy level?: Daily Walks  How would you describe your daily physical activity?: Moderate  How would you describe your current health state?: Fair  How do you maintain positive mental well-being?: Visiting Friends;Visiting Family  On a scale of 0 to 10, with 0 being no pain and 10 being severe pain, what is your pain level?: 3 - (Mild)  In the past  six months, have you experienced urine leakage?: 0-No  At any time do you feel concerned for the safety/well-being of yourself and/or your children, in your home or elsewhere?: No  Have you had any immunizations at another office such as Influenza, Hepatitis B, Tetanus, or Pneumococcal?: No        Jorge A Duffy's SCREENING SCHEDULE   Tests on this list are recommended by your physician but may not be covered, or covered at this frequency, by your insurer.   Please check with your insurance carrier before scheduling to verify coverage.   PREVENTATIVE SERVICES FREQUENCY &  COVERAGE DETAILS LAST COMPLETION DATE   Diabetes Screening    Fasting Blood Sugar / Glucose    One screening every 12 months if never tested or if previously tested but not diagnosed with pre-diabetes   One screening every 6 months if diagnosed with pre-diabetes Lab Results   Component Value Date     (H) 04/11/2024        Cardiovascular Disease Screening    Lipid Panel  Cholesterol  Lipoprotein (HDL)  Triglycerides Covered every 5 years for all Medicare beneficiaries without apparent signs or symptoms of cardiovascular disease Lab Results   Component Value Date    CHOLEST 139 04/11/2024    HDL 55 04/11/2024    LDL 44 04/11/2024    TRIG 260 (H) 04/11/2024         Electrocardiogram (EKG)   Covered if needed at Welcome to Medicare, and non-screening if indicated for medical reasons -      Ultrasound Screening for Abdominal Aortic Aneurysm (AAA) Covered once in a lifetime for one of the following risk factors    Men who are 65-75 years old and have ever smoked    Anyone with a family history -     Colorectal Cancer Screening  Covered for ages 50-85; only need ONE of the following:    Colonoscopy   Covered every 10 years    Covered every 2 years if patient is at high risk or previous colonoscopy was abnormal 08/30/2022    Health Maintenance   Topic Date Due    Colorectal Cancer Screening  08/30/2027       Flexible Sigmoidoscopy   Covered every 4  years -    Fecal Occult Blood Test Covered annually -   Prostate Cancer Screening    Prostate-Specific Antigen (PSA) Annually No results found for: \"PSA\"  Health Maintenance   Topic Date Due    PSA  05/17/2025      Immunizations    Influenza Covered once per flu season  Please get every year 10/18/2023  No recommendations at this time    Pneumococcal Each vaccine (Sdbavft19 & Anpgoslmt05) covered once after 65 Prevnar 13: -    Abinocazx29: 11/05/2020     No recommendations at this time    Hepatitis B One screening covered for patients with certain risk factors   -  No recommendations at this time    Tetanus Toxoid Not covered by Medicare Part B unless medically necessary (cut with metal); may be covered with your pharmacy prescription benefits -    Tetanus, Diptheria and Pertusis TD and TDaP Not covered by Medicare Part B -  No recommendations at this time    Zoster Not covered by Medicare Part B; may be covered with your pharmacy  prescription benefits 10/18/2019  No recommendations at this time     Diabetes      Hemoglobin A1C Annually; if last result is elevated, may be asked to retest more frequently.    Medicare covers every 3 months Lab Results   Component Value Date    A1C 5.9 (A) 05/07/2024       No recommendations at this time    Creat/alb ratio Annually Lab Results   Component Value Date    MICROALBCREA 40.7 (H) 05/07/2024       LDL Annually Lab Results   Component Value Date    LDL 44 04/11/2024       Dilated Eye Exam Annually Last Diabetic Eye Exam:  Last Dilated Eye Exam: 05/08/24  Eye Exam shows Diabetic Retinopathy?: No       Annual Monitoring of Persistent Medications (ACE/ARB, digoxin diuretics, anticonvulsants)    Potassium Annually Lab Results   Component Value Date    K 4.7 04/11/2024         Creatinine   Annually Lab Results   Component Value Date    CREATSERUM 1.17 04/11/2024         BUN Annually Lab Results   Component Value Date    BUN 25 (H) 04/11/2024       Drug Serum Conc Annually No  results found for: \"DIGOXIN\", \"DIG\", \"VALP\"                 Overall 60 minutes was spent on this encounter, 40 mins spent reviewing and providing care coordination for these conditions: dm, htn, lipid, neuropathy  20  minutes was spent reviewing wellness elements and providing age appropriate screenings.

## 2024-05-07 NOTE — PATIENT INSTRUCTIONS
Call to make appt with neurologist    Start duloxetine nightly for neuropathy    Go to lab before next appt    Start famotidine with each dose of naproxen    Followup in 3 months      Jorge A Duffy's SCREENING SCHEDULE   Tests on this list are recommended by your physician but may not be covered, or covered at this frequency, by your insurer.   Please check with your insurance carrier before scheduling to verify coverage.   PREVENTATIVE SERVICES FREQUENCY &  COVERAGE DETAILS LAST COMPLETION DATE   Diabetes Screening    Fasting Blood Sugar / Glucose    One screening every 12 months if never tested or if previously tested but not diagnosed with pre-diabetes   One screening every 6 months if diagnosed with pre-diabetes Lab Results   Component Value Date     (H) 04/11/2024        Cardiovascular Disease Screening    Lipid Panel  Cholesterol  Lipoprotein (HDL)  Triglycerides Covered every 5 years for all Medicare beneficiaries without apparent signs or symptoms of cardiovascular disease Lab Results   Component Value Date    CHOLEST 139 04/11/2024    HDL 55 04/11/2024    LDL 44 04/11/2024    TRIG 260 (H) 04/11/2024         Electrocardiogram (EKG)   Covered if needed at Welcome to Medicare, and non-screening if indicated for medical reasons -      Ultrasound Screening for Abdominal Aortic Aneurysm (AAA) Covered once in a lifetime for one of the following risk factors    Men who are 65-75 years old and have ever smoked    Anyone with a family history -     Colorectal Cancer Screening  Covered for ages 50-85; only need ONE of the following:    Colonoscopy   Covered every 10 years    Covered every 2 years if patient is at high risk or previous colonoscopy was abnormal 08/30/2022    Health Maintenance   Topic Date Due    Colorectal Cancer Screening  08/30/2027       Flexible Sigmoidoscopy   Covered every 4 years -    Fecal Occult Blood Test Covered annually -   Prostate Cancer Screening    Prostate-Specific Antigen  (PSA) Annually No results found for: \"PSA\"  Health Maintenance   Topic Date Due    PSA  05/17/2025      Immunizations    Influenza Covered once per flu season  Please get every year 10/18/2023  No recommendations at this time    Pneumococcal Each vaccine (Hgsliuz25 & Mlafibqnk31) covered once after 65 Prevnar 13: -    Vyyjqkugl27: 11/05/2020     No recommendations at this time    Hepatitis B One screening covered for patients with certain risk factors   -  No recommendations at this time    Tetanus Toxoid Not covered by Medicare Part B unless medically necessary (cut with metal); may be covered with your pharmacy prescription benefits -    Tetanus, Diptheria and Pertusis TD and TDaP Not covered by Medicare Part B -  No recommendations at this time    Zoster Not covered by Medicare Part B; may be covered with your pharmacy  prescription benefits 10/18/2019  No recommendations at this time     Diabetes      Hemoglobin A1C Annually; if last result is elevated, may be asked to retest more frequently.    Medicare covers every 3 months Lab Results   Component Value Date    A1C 5.8 (A) 10/10/2023       Hemoglobin A1C Test due on 04/10/2024    Creat/alb ratio Annually Lab Results   Component Value Date    MICROALBCREA 27.7 05/01/2023       LDL Annually Lab Results   Component Value Date    LDL 44 04/11/2024       Dilated Eye Exam Annually [unfilled]     Annual Monitoring of Persistent Medications (ACE/ARB, digoxin diuretics, anticonvulsants)    Potassium Annually Lab Results   Component Value Date    K 4.7 04/11/2024         Creatinine   Annually Lab Results   Component Value Date    CREATSERUM 1.17 04/11/2024         BUN Annually Lab Results   Component Value Date    BUN 25 (H) 04/11/2024       Drug Serum Conc Annually No results found for: \"DIGOXIN\", \"DIG\", \"VALP\"

## 2024-05-09 ENCOUNTER — MED REC SCAN ONLY (OUTPATIENT)
Dept: FAMILY MEDICINE CLINIC | Facility: CLINIC | Age: 69
End: 2024-05-09

## 2024-07-01 ENCOUNTER — LAB ENCOUNTER (OUTPATIENT)
Dept: LAB | Age: 69
End: 2024-07-01
Attending: FAMILY MEDICINE
Payer: MEDICARE

## 2024-07-01 DIAGNOSIS — Z12.5 SCREENING FOR MALIGNANT NEOPLASM OF PROSTATE: ICD-10-CM

## 2024-07-01 DIAGNOSIS — Z79.899 NEED FOR PROPHYLACTIC CHEMOTHERAPY: Primary | ICD-10-CM

## 2024-07-01 DIAGNOSIS — E78.2 MIXED HYPERLIPIDEMIA: ICD-10-CM

## 2024-07-01 LAB
ALT SERPL-CCNC: 60 U/L
AST SERPL-CCNC: 34 U/L (ref 15–37)
COMPLEXED PSA SERPL-MCNC: 0.82 NG/ML (ref ?–4)
TSI SER-ACNC: 1.31 MIU/ML (ref 0.36–3.74)

## 2024-07-01 PROCEDURE — 84450 TRANSFERASE (AST) (SGOT): CPT

## 2024-07-01 PROCEDURE — 84443 ASSAY THYROID STIM HORMONE: CPT

## 2024-07-01 PROCEDURE — 84460 ALANINE AMINO (ALT) (SGPT): CPT

## 2024-07-01 PROCEDURE — 36415 COLL VENOUS BLD VENIPUNCTURE: CPT

## 2024-07-11 ENCOUNTER — OFFICE VISIT (OUTPATIENT)
Dept: NEUROLOGY | Facility: CLINIC | Age: 69
End: 2024-07-11
Payer: MEDICARE

## 2024-07-11 VITALS
RESPIRATION RATE: 16 BRPM | DIASTOLIC BLOOD PRESSURE: 72 MMHG | BODY MASS INDEX: 36 KG/M2 | WEIGHT: 234.19 LBS | HEART RATE: 74 BPM | SYSTOLIC BLOOD PRESSURE: 130 MMHG

## 2024-07-11 DIAGNOSIS — J34.89 NASAL PAIN: ICD-10-CM

## 2024-07-11 DIAGNOSIS — H02.89: ICD-10-CM

## 2024-07-11 DIAGNOSIS — G43.919 REFRACTORY MIGRAINE: Primary | ICD-10-CM

## 2024-07-11 PROCEDURE — 99204 OFFICE O/P NEW MOD 45 MIN: CPT | Performed by: OTHER

## 2024-07-11 PROCEDURE — 3078F DIAST BP <80 MM HG: CPT | Performed by: OTHER

## 2024-07-11 PROCEDURE — 1159F MED LIST DOCD IN RCRD: CPT | Performed by: OTHER

## 2024-07-11 PROCEDURE — 3075F SYST BP GE 130 - 139MM HG: CPT | Performed by: OTHER

## 2024-07-11 RX ORDER — GABAPENTIN 100 MG/1
CAPSULE ORAL
Qty: 90 CAPSULE | Refills: 1 | Status: SHIPPED | OUTPATIENT
Start: 2024-07-11

## 2024-07-11 NOTE — PATIENT INSTRUCTIONS
Refill policies:    Allow 2-3 business days for refills; controlled substances may take longer.  Contact your pharmacy at least 5 days prior to running out of medication and have them send an electronic request or submit request through the “request refill” option in your [email or web address] account.  Refills are not addressed on weekends; covering physicians do not authorize routine medications on weekends.  No narcotics or controlled substances are refilled after noon on Fridays or by on call physicians.  By law, narcotics must be electronically prescribed.  A 30 day supply with no refills is the maximum allowed.  If your prescription is due for a refill, you may be due for a follow up appointment.  To best provide you care, patients receiving routine medications need to be seen at least once a year.  Patients receiving narcotic/controlled substance medications need to be seen at least once every 3 months.  In the event that your preferred pharmacy does not have the requested medication in stock (e.g. Backordered), it is your responsibility to find another pharmacy that has the requested medication available.  We will gladly send a new prescription to that pharmacy at your request.    Scheduling Tests:    If your physician has ordered radiology tests such as MRI or CT scans, please contact Central Scheduling at [phone] right away to schedule the test.  Once scheduled, the [facility] Centralized Referral Team will work with your insurance carrier to obtain pre-certification or prior authorization.  Depending on your insurance carrier, approval may take 3-10 days.  It is highly recommended patients assure they have received an authorization before having a test performed.  If test is done without insurance authorization, patient may be responsible for the entire amount billed.      Precertification and Prior Authorizations:  If your physician has recommended that you have a procedure or additional testing performed the [facility]  Centralized Referral Team will contact your insurance carrier to obtain pre-certification or prior authorization.    You are strongly encouraged to contact your insurance carrier to verify that your procedure/test has been approved and is a COVERED benefit.  Although the [facility] Centralized Referral Team does its due diligence, the insurance carrier gives the disclaimer that \"Although the procedure is authorized, this does not guarantee payment.\"    Ultimately the patient is responsible for payment.   Thank you for your understanding in this matter.  Paperwork Completion:  If you require FMLA or disability paperwork for your recovery, please make sure to either drop it off or have it faxed to our office at [phone]. Be sure the form has your name and date of birth on it.  The form will be faxed to our Forms Department and they will complete it for you.  There is a 25$ fee for all forms that need to be filled out.  Please be aware there is a 10-14 day turnaround time.  You will need to sign a release of information (BERNARDO) form if your paperwork does not come with one.  You may call the Forms Department with any questions at [phone].  Their fax number is [phone].

## 2024-07-17 ENCOUNTER — TELEPHONE (OUTPATIENT)
Dept: FAMILY MEDICINE CLINIC | Facility: CLINIC | Age: 69
End: 2024-07-17

## 2024-07-17 ENCOUNTER — OFFICE VISIT (OUTPATIENT)
Dept: FAMILY MEDICINE CLINIC | Facility: CLINIC | Age: 69
End: 2024-07-17
Payer: MEDICARE

## 2024-07-17 VITALS
RESPIRATION RATE: 18 BRPM | OXYGEN SATURATION: 96 % | TEMPERATURE: 98 F | SYSTOLIC BLOOD PRESSURE: 134 MMHG | WEIGHT: 234 LBS | HEART RATE: 68 BPM | BODY MASS INDEX: 36.3 KG/M2 | DIASTOLIC BLOOD PRESSURE: 82 MMHG | HEIGHT: 67.5 IN

## 2024-07-17 DIAGNOSIS — L03.113 CELLULITIS OF RIGHT ARM: Primary | ICD-10-CM

## 2024-07-17 PROCEDURE — 3079F DIAST BP 80-89 MM HG: CPT | Performed by: NURSE PRACTITIONER

## 2024-07-17 PROCEDURE — 99213 OFFICE O/P EST LOW 20 MIN: CPT | Performed by: NURSE PRACTITIONER

## 2024-07-17 PROCEDURE — 1160F RVW MEDS BY RX/DR IN RCRD: CPT | Performed by: NURSE PRACTITIONER

## 2024-07-17 PROCEDURE — 3008F BODY MASS INDEX DOCD: CPT | Performed by: NURSE PRACTITIONER

## 2024-07-17 PROCEDURE — 3075F SYST BP GE 130 - 139MM HG: CPT | Performed by: NURSE PRACTITIONER

## 2024-07-17 PROCEDURE — 1159F MED LIST DOCD IN RCRD: CPT | Performed by: NURSE PRACTITIONER

## 2024-07-17 RX ORDER — CEPHALEXIN 500 MG/1
500 CAPSULE ORAL 2 TIMES DAILY
Qty: 20 CAPSULE | Refills: 0 | Status: SHIPPED | OUTPATIENT
Start: 2024-07-17 | End: 2024-07-27

## 2024-07-17 NOTE — TELEPHONE ENCOUNTER
Patient calling with right arm cellulitis is started about 3 days ago - swollen and tender - warm to the touch.    Would like to see Dr. Romeo Doan     Please advise.

## 2024-07-17 NOTE — PROGRESS NOTES
CHIEF COMPLAINT:     Chief Complaint   Patient presents with    Swelling     Redness and painful for 2 days.         HPI:     Jorge A Duffy is a 68 year old male who presents with concerns of cellulitis. Patient states symptoms present 2  days.  Reports erythema, increased warmth, some tenderness to palpation, and no drainage from area.  Symptoms have been worsening since onset.  Affected location includes: below right elbow.    Precipitating event: none, but had cellulitis in the same area about 9 mo ago.  Treatments: none.  Associated symptoms include: none.  Denies fever, streaking of wound, or other signs of systemic illness.       Current Outpatient Medications   Medication Sig Dispense Refill    cephalexin 500 MG Oral Cap Take 1 capsule (500 mg total) by mouth 2 (two) times daily for 10 days. 20 capsule 0    gabapentin 100 MG Oral Cap Week 1- take 1 cap three times a day, Week 2- if eye pain not better, take 2 caps three times a day, Week 3 and on- if tolerating take 3 caps three times a day 90 capsule 1    famotidine 20 MG Oral Tab Take 1 tablet (20 mg total) by mouth 2 (two) times daily. 180 tablet 1    DULoxetine 30 MG Oral Cap DR Particles Take 1 capsule (30 mg total) by mouth daily. 90 capsule 1    pramipexole 0.25 MG Oral Tab Take 1 tablet (0.25 mg total) by mouth daily. 90 tablet 3    SIMVASTATIN 20 MG Oral Tab TAKE 1 TABLET EVERY NIGHT 90 tablet 3    losartan 100 MG Oral Tab Take 1 tablet (100 mg total) by mouth daily. 90 tablet 0    latanoprost 0.005 % Ophthalmic Solution Apply 1 drop to eye nightly.      Upadacitinib ER (RINVOQ) 15 MG Oral Tablet 24 Hr Take 15 mg by mouth daily.      Glucose Blood (ACCU-CHEK GUIDE) In Vitro Strip 1 each by Finger stick route daily. E11.9 100 strip 1    Alpha-Lipoic Acid 600 MG Oral Cap Take 1 capsule by mouth 2 (two) times a day.      Naproxen Sodium 220 MG Oral Cap Take 220 mg by mouth 2 (two) times a day.      Multiple Vitamin (MULTI-VITAMIN DAILY OR) Take by  mouth.      Calcium Carbonate-Vit D-Min (CALCIUM 1200 OR) Take 1 capsule by mouth daily.      Glucosamine-Chondroit-Vit C-Mn (GLUCOSAMINE 1500 COMPLEX OR) Take 1 capsule by mouth daily.      B Complex Vitamins (B COMPLEX 100 OR) Take 1 tablet by mouth daily.        Past Medical History:    Back pain    spinal stenosis - doesn't hurt now    Calculus of kidney    Itch of skin    A-Topic dermatitis    Rash    Sleep apnea    Sleep disturbance    Sleep Apnea    Wears glasses      Social History:  Social History     Socioeconomic History    Marital status: Single   Tobacco Use    Smoking status: Never    Smokeless tobacco: Never   Vaping Use    Vaping status: Never Used   Substance and Sexual Activity    Alcohol use: Not Currently     Alcohol/week: 1.0 standard drink of alcohol     Types: 1 Glasses of wine per week     Comment: Causes problems with RLS    Drug use: Yes     Frequency: 2.0 times per week     Types: Cannabis   Other Topics Concern    Caffeine Concern Yes     Comment: 1 cup of coffee daily    Stress Concern No    Weight Concern No    Special Diet No    Exercise Yes     Comment: daily walking     Seat Belt Yes        REVIEW OF SYSTEMS:   GENERAL: feels well otherwise, no fever, no chills.  SKIN: as above.  CHEST: no chest pains, no palpitations.  LUNGS: denies shortness of breath with exertion or rest. No wheezing, no cough.  LYMPH: no enlargement of the lymph nodes.  MUSC/SKEL: no joint swelling, no joint stiffness.  NEURO: no abnormal sensation, no tingling of the skin or numbness.    EXAM:   /82   Pulse 68   Temp 97.9 °F (36.6 °C) (Oral)   Resp 18   Ht 5' 7.5\" (1.715 m)   Wt 234 lb (106.1 kg)   SpO2 96%   BMI 36.11 kg/m²   GENERAL: well developed, well nourished,in no apparent distress  SKIN: approx 6 cm x 8 cm area of erythema.  + swelling and warm to touch distal to right elbow.  HEAD: atraumatic, normocephalic  EYES: conjunctiva clear, EOM intact  NOSE: Normal external nose.  No  rhinorrhea.  NECK: supple, non-tender  LUNGS: clear to auscultation bilaterally, no wheezes or rhonchi. Breathing is non labored.  CARDIO: RRR without murmur  EXTREMITIES: no cyanosis, clubbing or edema.  Cap refill brisk- less than 2 seconds.   LYMPH: no lymphadenopathy.      ASSESSMENT AND PLAN:     ASSESSMENT:  Encounter Diagnosis   Name Primary?    Cellulitis of right arm Yes       PLAN: Skin care discussed with patient. Borders or erythema marked.  Instructions and Comfort Care as listed in Patient Instructions.  Medication as below.    Requested Prescriptions     Signed Prescriptions Disp Refills    cephalexin 500 MG Oral Cap 20 capsule 0     Sig: Take 1 capsule (500 mg total) by mouth 2 (two) times daily for 10 days.     Risks, benefits, side effects of medication explained and discussed.     There are no Patient Instructions on file for this visit.    The patient indicates understanding of these issues and agrees to the plan.  The patient is asked to see PCP in 3 days if symptoms not improving or for worsening symptoms.

## 2024-07-19 RX ORDER — LOSARTAN POTASSIUM 100 MG/1
100 TABLET ORAL DAILY
Qty: 90 TABLET | Refills: 3 | Status: SHIPPED | OUTPATIENT
Start: 2024-07-19

## 2024-08-06 DIAGNOSIS — G43.919 REFRACTORY MIGRAINE: Primary | ICD-10-CM

## 2024-08-06 RX ORDER — GABAPENTIN 100 MG/1
200 CAPSULE ORAL 2 TIMES DAILY
Qty: 120 CAPSULE | Refills: 2 | Status: SHIPPED | OUTPATIENT
Start: 2024-08-06

## 2024-08-06 NOTE — TELEPHONE ENCOUNTER
Patient is taking 2 capsules two times a day and does not plan on moving to 3 capsules three times a day.         Medication: GABAPENTIN 100 MG Oral Cap      Date of last refill: 07/11/2024 (#90/1)  [place]     Last office visit: 07/11/2024  Due back to clinic per last office note:  Around 01/11/2025  Date next office visit scheduled:    Future Appointments   Date Time Provider Department Center   8/13/2024  1:30 PM Romeo Doan MD [facility]   8/20/2024  4:20 PM Wilber Rodriguez MD [id number]           Last OV note recommendation:    ASSESSMENT/ACTIVE PROBLEM LIST:           Encounter Diagnoses   Name Primary?    Refractory migraine Yes    Eyelid pain of both eyes           Discussion/Plan:  Atypical bilateral eyelid, and medial canthus pain- negative ophtho, allergy, and derm work up  Not consistent with a facial neuralgia or headache syndrome- without pain in a wider trigeminal distribution- only eyelid pain, no retrorbital or ocular pain  Can consider gabapentin for symptomatic treatment. However, underlying etiology is still unclear, recommend ENT eval, as majority of pain involves bilateral medial eye canthi, and patient also reports nasal pressure  Patient would like to do empiric trial of gabapentin in the meantime.     Rash on scalp -eval dermatlogy      RLS- continue pramipexole for now  If tolerates gabapentin, this can actually help his RLS and potentially replace pramipexole, which patient was asking about potentially stopping, we did not really go into this. I can give him weaning schedule, instructed to contact via [employer]

## 2024-08-13 ENCOUNTER — OFFICE VISIT (OUTPATIENT)
Dept: FAMILY MEDICINE CLINIC | Facility: CLINIC | Age: 69
End: 2024-08-13
Payer: MEDICARE

## 2024-08-13 VITALS
HEART RATE: 80 BPM | SYSTOLIC BLOOD PRESSURE: 128 MMHG | BODY MASS INDEX: 36.67 KG/M2 | HEIGHT: 67.5 IN | TEMPERATURE: 98 F | OXYGEN SATURATION: 94 % | RESPIRATION RATE: 18 BRPM | WEIGHT: 236.38 LBS | DIASTOLIC BLOOD PRESSURE: 68 MMHG

## 2024-08-13 DIAGNOSIS — Z86.69 HISTORY OF MIGRAINE: ICD-10-CM

## 2024-08-13 DIAGNOSIS — R21 RASH: ICD-10-CM

## 2024-08-13 DIAGNOSIS — H57.13 EYE PAIN, BILATERAL: Primary | ICD-10-CM

## 2024-08-13 DIAGNOSIS — E78.2 MIXED HYPERLIPIDEMIA: ICD-10-CM

## 2024-08-13 DIAGNOSIS — E11.9 TYPE 2 DIABETES MELLITUS WITHOUT COMPLICATION, WITHOUT LONG-TERM CURRENT USE OF INSULIN (HCC): ICD-10-CM

## 2024-08-13 DIAGNOSIS — I10 ESSENTIAL HYPERTENSION: ICD-10-CM

## 2024-08-13 DIAGNOSIS — H57.89 EYE SWELLING, BILATERAL: ICD-10-CM

## 2024-08-13 PROCEDURE — G2211 COMPLEX E/M VISIT ADD ON: HCPCS | Performed by: FAMILY MEDICINE

## 2024-08-13 PROCEDURE — 99215 OFFICE O/P EST HI 40 MIN: CPT | Performed by: FAMILY MEDICINE

## 2024-08-13 PROCEDURE — 1160F RVW MEDS BY RX/DR IN RCRD: CPT | Performed by: FAMILY MEDICINE

## 2024-08-13 PROCEDURE — 3074F SYST BP LT 130 MM HG: CPT | Performed by: FAMILY MEDICINE

## 2024-08-13 PROCEDURE — 3008F BODY MASS INDEX DOCD: CPT | Performed by: FAMILY MEDICINE

## 2024-08-13 PROCEDURE — 1159F MED LIST DOCD IN RCRD: CPT | Performed by: FAMILY MEDICINE

## 2024-08-13 PROCEDURE — 3078F DIAST BP <80 MM HG: CPT | Performed by: FAMILY MEDICINE

## 2024-08-13 RX ORDER — ERYTHROMYCIN AND BENZOYL PEROXIDE 30; 50 MG/G; MG/G
1 GEL TOPICAL NIGHTLY
Qty: 23.3 G | Refills: 1 | Status: SHIPPED | OUTPATIENT
Start: 2024-08-13 | End: 2025-08-08

## 2024-08-13 RX ORDER — GABAPENTIN 300 MG/1
300 CAPSULE ORAL 2 TIMES DAILY
Qty: 180 CAPSULE | Refills: 1 | Status: SHIPPED | OUTPATIENT
Start: 2024-08-13

## 2024-08-15 NOTE — PROGRESS NOTES
Jorge A Duffy is a 68 year old male here for   Chief Complaint   Patient presents with    Follow - Up       HPI:       1. Eye pain, bilateral  2. Eye swelling, bilateral  3. History of migraine  -symptoms still about the same  -neuro did not think it was migraine and recommended ENT eval  -that appt is pending    4. Type 2 diabetes mellitus without complication, without long-term current use of insulin (HCC)  -due for foot exam    -Last A1c value was 5.9% done 5/7/2024.    -last eye exam: Last Diabetic Eye Exam:  Last Dilated Eye Exam: 05/08/24  Eye Exam shows Diabetic Retinopathy?: No    -denies hyper or hypo glycemic symptoms     5. Essential hypertension  -at goal    6. Mixed hyperlipidemia  -stable on statin    7. Rash  -notes bumps on scalp and head          HISTORY:  Past Medical History:    Back pain    spinal stenosis - doesn't hurt now    Calculus of kidney    Itch of skin    A-Topic dermatitis    Rash    Sleep apnea    Sleep disturbance    Sleep Apnea    Wears glasses      Past Surgical History:   Procedure Laterality Date    Organ transplant  1989    Donor - kidney      History reviewed. No pertinent family history.   Social History:   Social History     Socioeconomic History    Marital status: Single   Tobacco Use    Smoking status: Never    Smokeless tobacco: Never   Vaping Use    Vaping status: Never Used   Substance and Sexual Activity    Alcohol use: Not Currently     Alcohol/week: 1.0 standard drink of alcohol     Types: 1 Glasses of wine per week     Comment: Causes problems with RLS    Drug use: Yes     Frequency: 2.0 times per week     Types: Cannabis   Other Topics Concern    Caffeine Concern Yes     Comment: 1 cup of coffee daily    Stress Concern No    Weight Concern No    Special Diet No    Exercise Yes     Comment: daily walking     Seat Belt Yes        Medications (Active prior to today's visit):  Current Outpatient Medications   Medication Sig Dispense Refill    Benzoyl  Peroxide-Erythromycin 5-3 % External Gel Apply 1 Application topically nightly. 23.3 g 1    gabapentin 300 MG Oral Cap Take 1 capsule (300 mg total) by mouth in the morning and 1 capsule (300 mg total) before bedtime. 180 capsule 1    LOSARTAN 100 MG Oral Tab TAKE 1 TABLET EVERY DAY 90 tablet 3    famotidine 20 MG Oral Tab Take 1 tablet (20 mg total) by mouth 2 (two) times daily. 180 tablet 1    DULoxetine 30 MG Oral Cap DR Particles Take 1 capsule (30 mg total) by mouth daily. 90 capsule 1    pramipexole 0.25 MG Oral Tab Take 1 tablet (0.25 mg total) by mouth daily. 90 tablet 3    SIMVASTATIN 20 MG Oral Tab TAKE 1 TABLET EVERY NIGHT 90 tablet 3    latanoprost 0.005 % Ophthalmic Solution Apply 1 drop to eye nightly.      Upadacitinib ER (RINVOQ) 15 MG Oral Tablet 24 Hr Take 15 mg by mouth daily.      Glucose Blood (ACCU-CHEK GUIDE) In Vitro Strip 1 each by Finger stick route daily. E11.9 100 strip 1    Alpha-Lipoic Acid 600 MG Oral Cap Take 1 capsule by mouth 2 (two) times a day.      Naproxen Sodium 220 MG Oral Cap Take 220 mg by mouth 2 (two) times a day.      Multiple Vitamin (MULTI-VITAMIN DAILY OR) Take by mouth.      Calcium Carbonate-Vit D-Min (CALCIUM 1200 OR) Take 1 capsule by mouth daily.      Glucosamine-Chondroit-Vit C-Mn (GLUCOSAMINE 1500 COMPLEX OR) Take 1 capsule by mouth daily.      B Complex Vitamins (B COMPLEX 100 OR) Take 1 tablet by mouth daily.         Allergies:  No Known Allergies      ROS:   See HPI for relevant ROS    --GEN: No other complaints  --HEENT: No other complaints  --RESP: No other complaints  --CV: No other complaints  --GI: No other complaints  --MSK: No other complaints    All other systems reviewed are negative    PHYSICAL EXAM:   /68 (BP Location: Left arm, Patient Position: Sitting, Cuff Size: adult)   Pulse 80   Temp 97.9 °F (36.6 °C) (Temporal)   Resp 18   Ht 5' 7.5\" (1.715 m)   Wt 236 lb 6.4 oz (107.2 kg)   SpO2 94%   BMI 36.48 kg/m²     Gen: NAD  HEENT: NCAT,  pupils equal and round  Pulm: CTAB, no wheezing  CV: RRR  Ext: full ROM  Psych: normal affect    Bilateral barefoot skin diabetic exam is normal, visualized feet and the appearance is normal.  Bilateral monofilament/sensation of both feet is normal.  Pulsation pedal pulse exam of both lower legs/feet is normal as well.         ASSESSMENT/PLAN:     1. Eye pain, bilateral  2. Eye swelling, bilateral  3. History of migraine  -f/u with ENT as plannned  -c/w eye drops prn    4. Type 2 diabetes mellitus without complication, without long-term current use of insulin (HCC)  -foot exam normal  -up to date on eye exam  -A1c at goal    5. Essential hypertension  -stable    6. Mixed hyperlipidemia  -c/w statin    7. Rash  -start erythromycin-benzoyl gel  -consider derm prn          Chronic Conditions:    No problem-specific Assessment & Plan notes found for this encounter.       Health Maintenance:  Health Maintenance   Topic Date Due    COVID-19 Vaccine (6 - 2023-24 season) 02/18/2024    Diabetes Care Foot Exam  05/01/2024    Pneumococcal Vaccine: 65+ Years (3 of 3 - PCV) 10/10/2024 (Originally 11/5/2021)    Zoster Vaccines (1 of 2) 10/10/2024 (Originally 12/13/2019)    Influenza Vaccine (1) 10/01/2024    Diabetes Care A1C  11/07/2024    Diabetes Care: GFR  04/11/2025    Diabetes Care: Microalb/Creat Ratio  05/07/2025    Diabetes Care Dilated Eye Exam  05/08/2025    PSA  07/01/2026    Colorectal Cancer Screening  08/30/2027    MA Annual Health Assessment  Completed    Annual Depression Screening  Completed    Fall Risk Screening (Annual)  Completed    TB Screen  Discontinued               The patient is asked to return in 3-6 months, sooner prn.    Orders This Visit:  No orders of the defined types were placed in this encounter.      Meds This Visit:  Requested Prescriptions     Signed Prescriptions Disp Refills    Benzoyl Peroxide-Erythromycin 5-3 % External Gel 23.3 g 1     Sig: Apply 1 Application topically nightly.     gabapentin 300 MG Oral Cap 180 capsule 1     Sig: Take 1 capsule (300 mg total) by mouth in the morning and 1 capsule (300 mg total) before bedtime.       Imaging & Referrals:  None     TOM CID MD    I spent a total of 40 minutes, more than half of which was spent counseling/coordinating care regarding rash, dm, htn, lipid, eye

## 2024-08-20 ENCOUNTER — OFFICE VISIT (OUTPATIENT)
Facility: LOCATION | Age: 69
End: 2024-08-20
Payer: MEDICARE

## 2024-08-20 DIAGNOSIS — J34.89 NASAL PAIN: Primary | ICD-10-CM

## 2024-08-20 PROCEDURE — 1159F MED LIST DOCD IN RCRD: CPT | Performed by: OTOLARYNGOLOGY

## 2024-08-20 PROCEDURE — 1160F RVW MEDS BY RX/DR IN RCRD: CPT | Performed by: OTOLARYNGOLOGY

## 2024-08-20 PROCEDURE — 31231 NASAL ENDOSCOPY DX: CPT | Performed by: OTOLARYNGOLOGY

## 2024-08-20 PROCEDURE — 99203 OFFICE O/P NEW LOW 30 MIN: CPT | Performed by: OTOLARYNGOLOGY

## 2024-08-20 NOTE — PROGRESS NOTES
Jorge A Duffy is a 68 year old male.   Chief Complaint   Patient presents with    Nose Problem     HPI:   He has a history of eyelid pain.  He has had this for 2 years and feels like is worsening.  He has seen numerous eye doctors without relief.  He has tried numerous kinds of drops without relief.  At times a sting.  He has tried Astelin nasal spray without relief.  He feels like maybe something in the back of his nose is contributing.  He has also had pain over the nasal dorsum.  He saw neurology and they ruled out cluster headache.  Current Outpatient Medications   Medication Sig Dispense Refill    Benzoyl Peroxide-Erythromycin 5-3 % External Gel Apply 1 Application topically nightly. 23.3 g 1    gabapentin 300 MG Oral Cap Take 1 capsule (300 mg total) by mouth in the morning and 1 capsule (300 mg total) before bedtime. 180 capsule 1    LOSARTAN 100 MG Oral Tab TAKE 1 TABLET EVERY DAY 90 tablet 3    famotidine 20 MG Oral Tab Take 1 tablet (20 mg total) by mouth 2 (two) times daily. 180 tablet 1    DULoxetine 30 MG Oral Cap DR Particles Take 1 capsule (30 mg total) by mouth daily. 90 capsule 1    pramipexole 0.25 MG Oral Tab Take 1 tablet (0.25 mg total) by mouth daily. 90 tablet 3    SIMVASTATIN 20 MG Oral Tab TAKE 1 TABLET EVERY NIGHT 90 tablet 3    latanoprost 0.005 % Ophthalmic Solution Apply 1 drop to eye nightly.      Upadacitinib ER (RINVOQ) 15 MG Oral Tablet 24 Hr Take 15 mg by mouth daily.      Glucose Blood (ACCU-CHEK GUIDE) In Vitro Strip 1 each by Finger stick route daily. E11.9 100 strip 1    Alpha-Lipoic Acid 600 MG Oral Cap Take 1 capsule by mouth 2 (two) times a day.      Naproxen Sodium 220 MG Oral Cap Take 220 mg by mouth 2 (two) times a day.      Multiple Vitamin (MULTI-VITAMIN DAILY OR) Take by mouth.      Calcium Carbonate-Vit D-Min (CALCIUM 1200 OR) Take 1 capsule by mouth daily.      Glucosamine-Chondroit-Vit C-Mn (GLUCOSAMINE 1500 COMPLEX OR) Take 1 capsule by mouth daily.      B Complex  Vitamins (B COMPLEX 100 OR) Take 1 tablet by mouth daily.        Past Medical History:    Back pain    spinal stenosis - doesn't hurt now    Calculus of kidney    Itch of skin    A-Topic dermatitis    Rash    Sleep apnea    Sleep disturbance    Sleep Apnea    Wears glasses      Social History:  Social History     Socioeconomic History    Marital status: Single   Tobacco Use    Smoking status: Never    Smokeless tobacco: Never   Vaping Use    Vaping status: Never Used   Substance and Sexual Activity    Alcohol use: Not Currently     Alcohol/week: 1.0 standard drink of alcohol     Types: 1 Glasses of wine per week     Comment: Causes problems with RLS    Drug use: Yes     Frequency: 2.0 times per week     Types: Cannabis   Other Topics Concern    Caffeine Concern Yes     Comment: 1 cup of coffee daily    Stress Concern No    Weight Concern No    Special Diet No    Exercise Yes     Comment: daily walking     Seat Belt Yes      Past Surgical History:   Procedure Laterality Date    Organ transplant  1989    Donor - kidney         REVIEW OF SYSTEMS:   GENERAL HEALTH: feels well otherwise  GENERAL : denies fever, chills, sweats, weight loss, weight gain  SKIN: denies any unusual skin lesions or rashes  RESPIRATORY: denies shortness of breath with exertion  NEURO: denies headaches    EXAM:   There were no vitals taken for this visit.    System Findings Details   Constitutional  Overall appearance - Normal.   Psychiatric  Orientation - Oriented to time, place, person & situation. Appropriate mood and affect.   Head/Face  Facial features -- Normal. Skull - Normal.   Eyes  Pupils equal ,round ,react to light and accomidate   Ears, Nose, Throat, Neck  Ears clear nose clear pharynx clear neck no masses   Neurological  Memory - Normal. Cranial nerves - Cranial nerves II through XII grossly intact.   Lymph Detail  Submental. Submandibular. Anterior cervical. Posterior cervical. Supraclavicular.     Procedure:  Due to inability for  adequate examination of the nasal cavity and nasopharynx and need for magnification to perform the examination, endoscopy was offered.  The nasal endoscope was utilized as it was imperative to inspect the interior of the nasal cavity and the middle and superior meatus along with the turbinates and sphenoethmoid recess.  Risks and benefits were discussed with patient/family and they have given consent to proceed. A rigid zero degree scope was inserted.    Findings:  The anterior of the nasal cavity along with the middle and superior meatus and turbinates and the sphenoethmoid recess were evaluated.  There is no nasal polyps.  The nasal mucosa is pink.  There is no purulence seen.  ASSESSMENT AND PLAN:   1. Nasal pain  I am unsure of the exact etiology of his eyelid and nasal dorsal pain.  My examination today is essentially negative.  He has seen numerous eye doctors and a neurologist.  I have recommended he consider a neuro-ophthalmology evaluation.      The patient indicates understanding of these issues and agrees to the plan.    No follow-ups on file.    Wilber Rodriguez MD  8/20/2024  5:43 PM

## 2024-08-27 ENCOUNTER — MED REC SCAN ONLY (OUTPATIENT)
Dept: FAMILY MEDICINE CLINIC | Facility: CLINIC | Age: 69
End: 2024-08-27

## 2024-08-27 ENCOUNTER — TELEPHONE (OUTPATIENT)
Dept: FAMILY MEDICINE CLINIC | Facility: CLINIC | Age: 69
End: 2024-08-27

## 2024-10-28 ENCOUNTER — MED REC SCAN ONLY (OUTPATIENT)
Dept: FAMILY MEDICINE CLINIC | Facility: CLINIC | Age: 69
End: 2024-10-28

## 2024-11-05 ENCOUNTER — LAB ENCOUNTER (OUTPATIENT)
Dept: LAB | Age: 69
End: 2024-11-05
Payer: MEDICARE

## 2024-11-05 DIAGNOSIS — Z79.899 NEED FOR PROPHYLACTIC CHEMOTHERAPY: Primary | ICD-10-CM

## 2024-11-05 LAB
ALT SERPL-CCNC: 64 U/L
AST SERPL-CCNC: 63 U/L (ref ?–34)
BASOPHILS # BLD AUTO: 0.01 X10(3) UL (ref 0–0.2)
BASOPHILS NFR BLD AUTO: 0.2 %
CHOLEST SERPL-MCNC: 170 MG/DL (ref ?–200)
EOSINOPHIL # BLD AUTO: 0.05 X10(3) UL (ref 0–0.7)
EOSINOPHIL NFR BLD AUTO: 1.1 %
ERYTHROCYTE [DISTWIDTH] IN BLOOD BY AUTOMATED COUNT: 13.4 %
FASTING PATIENT LIPID ANSWER: YES
HCT VFR BLD AUTO: 40.9 %
HDLC SERPL-MCNC: 61 MG/DL (ref 40–59)
HGB BLD-MCNC: 13.7 G/DL
IMM GRANULOCYTES # BLD AUTO: 0.02 X10(3) UL (ref 0–1)
IMM GRANULOCYTES NFR BLD: 0.4 %
LDLC SERPL CALC-MCNC: 88 MG/DL (ref ?–100)
LYMPHOCYTES # BLD AUTO: 1.48 X10(3) UL (ref 1–4)
LYMPHOCYTES NFR BLD AUTO: 32.9 %
MCH RBC QN AUTO: 31.7 PG (ref 26–34)
MCHC RBC AUTO-ENTMCNC: 33.5 G/DL (ref 31–37)
MCV RBC AUTO: 94.7 FL
MONOCYTES # BLD AUTO: 0.56 X10(3) UL (ref 0.1–1)
MONOCYTES NFR BLD AUTO: 12.4 %
NEUTROPHILS # BLD AUTO: 2.38 X10 (3) UL (ref 1.5–7.7)
NEUTROPHILS # BLD AUTO: 2.38 X10(3) UL (ref 1.5–7.7)
NEUTROPHILS NFR BLD AUTO: 53 %
NONHDLC SERPL-MCNC: 109 MG/DL (ref ?–130)
PLATELET # BLD AUTO: 206 10(3)UL (ref 150–450)
RBC # BLD AUTO: 4.32 X10(6)UL
TRIGL SERPL-MCNC: 119 MG/DL (ref 30–149)
VLDLC SERPL CALC-MCNC: 19 MG/DL (ref 0–30)
WBC # BLD AUTO: 4.5 X10(3) UL (ref 4–11)

## 2024-11-05 PROCEDURE — 84450 TRANSFERASE (AST) (SGOT): CPT

## 2024-11-05 PROCEDURE — 36415 COLL VENOUS BLD VENIPUNCTURE: CPT

## 2024-11-05 PROCEDURE — 85025 COMPLETE CBC W/AUTO DIFF WBC: CPT

## 2024-11-05 PROCEDURE — 84460 ALANINE AMINO (ALT) (SGPT): CPT

## 2024-11-05 PROCEDURE — 80061 LIPID PANEL: CPT

## 2024-11-11 RX ORDER — FAMOTIDINE 20 MG/1
20 TABLET, FILM COATED ORAL 2 TIMES DAILY
Qty: 180 TABLET | Refills: 1 | Status: SHIPPED | OUTPATIENT
Start: 2024-11-11

## 2024-11-11 NOTE — TELEPHONE ENCOUNTER
Spoke to patient and he said that the 3 times/day request was not meant for the pepcid but for another med.  He takes this twice a day

## 2024-12-30 ENCOUNTER — TELEPHONE (OUTPATIENT)
Dept: FAMILY MEDICINE CLINIC | Facility: CLINIC | Age: 69
End: 2024-12-30

## 2024-12-30 ENCOUNTER — LAB ENCOUNTER (OUTPATIENT)
Dept: LAB | Age: 69
End: 2024-12-30
Payer: MEDICARE

## 2024-12-30 DIAGNOSIS — Z79.899 NEED FOR PROPHYLACTIC CHEMOTHERAPY: Primary | ICD-10-CM

## 2024-12-30 LAB
ALT SERPL-CCNC: 57 U/L
AST SERPL-CCNC: 47 U/L (ref ?–34)

## 2024-12-30 PROCEDURE — 84450 TRANSFERASE (AST) (SGOT): CPT

## 2024-12-30 PROCEDURE — 84460 ALANINE AMINO (ALT) (SGPT): CPT

## 2024-12-30 PROCEDURE — 36415 COLL VENOUS BLD VENIPUNCTURE: CPT

## 2024-12-31 RX ORDER — GABAPENTIN 300 MG/1
300 CAPSULE ORAL 2 TIMES DAILY
Qty: 180 CAPSULE | Refills: 1 | Status: SHIPPED | OUTPATIENT
Start: 2024-12-31 | End: 2025-01-06

## 2025-01-06 RX ORDER — GABAPENTIN 300 MG/1
300 CAPSULE ORAL 3 TIMES DAILY
Qty: 270 CAPSULE | Refills: 1 | Status: SHIPPED | OUTPATIENT
Start: 2025-01-06

## 2025-01-06 NOTE — TELEPHONE ENCOUNTER
Patient calling and is calling to revise his rx directions, it needs to say take 3 times a day - asking for 90 day supply and send new script to [facility] pharmacy     Please advise.

## 2025-01-16 ENCOUNTER — LAB ENCOUNTER (OUTPATIENT)
Dept: LAB | Age: 70
End: 2025-01-16
Attending: STUDENT IN AN ORGANIZED HEALTH CARE EDUCATION/TRAINING PROGRAM
Payer: MEDICARE

## 2025-01-16 DIAGNOSIS — H57.13 OCULAR PAIN, BILATERAL: Primary | ICD-10-CM

## 2025-01-16 LAB — ERYTHROCYTE [SEDIMENTATION RATE] IN BLOOD: 15 MM/HR

## 2025-01-16 PROCEDURE — 86038 ANTINUCLEAR ANTIBODIES: CPT

## 2025-01-16 PROCEDURE — 86037 ANCA TITER EACH ANTIBODY: CPT

## 2025-01-16 PROCEDURE — 83516 IMMUNOASSAY NONANTIBODY: CPT

## 2025-01-16 PROCEDURE — 86225 DNA ANTIBODY NATIVE: CPT

## 2025-01-16 PROCEDURE — 85652 RBC SED RATE AUTOMATED: CPT

## 2025-01-16 PROCEDURE — 36415 COLL VENOUS BLD VENIPUNCTURE: CPT

## 2025-01-17 LAB
DSDNA IGG SERPL IA-ACNC: 0.6 IU/ML
ENA AB SER QL IA: <0.09 UG/L
ENA AB SER QL IA: NEGATIVE

## 2025-01-20 LAB
ANTI-MPO ANTIBODIES: <0.2 UNITS
ANTI-PR3 ANTIBODIES: <0.2 UNITS

## 2025-01-30 RX ORDER — SIMVASTATIN 20 MG
20 TABLET ORAL NIGHTLY
Qty: 90 TABLET | Refills: 1 | Status: SHIPPED | OUTPATIENT
Start: 2025-01-30 | End: 2025-02-14

## 2025-02-14 ENCOUNTER — PATIENT MESSAGE (OUTPATIENT)
Dept: FAMILY MEDICINE CLINIC | Facility: CLINIC | Age: 70
End: 2025-02-14

## 2025-02-14 RX ORDER — OLMESARTAN MEDOXOMIL 20 MG/1
20 TABLET ORAL DAILY
Qty: 30 TABLET | Refills: 2 | Status: SHIPPED | OUTPATIENT
Start: 2025-02-14

## 2025-02-14 RX ORDER — ROSUVASTATIN CALCIUM 5 MG/1
5 TABLET, COATED ORAL NIGHTLY
Qty: 30 TABLET | Refills: 2 | Status: SHIPPED | OUTPATIENT
Start: 2025-02-14

## 2025-04-23 RX ORDER — GABAPENTIN 300 MG/1
300 CAPSULE ORAL 3 TIMES DAILY
Qty: 270 CAPSULE | Refills: 1 | Status: SHIPPED | OUTPATIENT
Start: 2025-04-23

## 2025-04-23 RX ORDER — ROSUVASTATIN CALCIUM 5 MG/1
5 TABLET, COATED ORAL NIGHTLY
Qty: 30 TABLET | Refills: 2 | Status: SHIPPED | OUTPATIENT
Start: 2025-04-23

## 2025-04-23 NOTE — TELEPHONE ENCOUNTER
Patient would like medications sent to [facility] pharmacy. Please review and advise.    LF 1/6/25, 2/14/25   [FreeTextEntry1] : 81 y/o F w/ pmhx of Leo (on Xarelto) referred to [facility]m Clinic for RUL nodule. \par \par # RUL 1.1cm Nodule - Stable since 2018; Unlikely malignant; \par - Patient not interested in a CT chest or any further workup at the moment;  \par \par # Dyspnea on Exertion - Unlikely pulmonary etiology; Clinical signs of volume overload on exam (pitting LE edema); Patient has appointment w/ Cardiologist next week;  \par \par RTC PRN

## 2025-04-30 ENCOUNTER — OFFICE VISIT (OUTPATIENT)
Dept: FAMILY MEDICINE CLINIC | Facility: CLINIC | Age: 70
End: 2025-04-30
Payer: MEDICARE

## 2025-04-30 VITALS
BODY MASS INDEX: 36.7 KG/M2 | HEIGHT: 67.72 IN | SYSTOLIC BLOOD PRESSURE: 136 MMHG | OXYGEN SATURATION: 95 % | DIASTOLIC BLOOD PRESSURE: 78 MMHG | TEMPERATURE: 97 F | RESPIRATION RATE: 18 BRPM | HEART RATE: 62 BPM | WEIGHT: 239.38 LBS

## 2025-04-30 DIAGNOSIS — I10 ESSENTIAL HYPERTENSION: ICD-10-CM

## 2025-04-30 DIAGNOSIS — Z00.00 ENCOUNTER FOR ANNUAL HEALTH EXAMINATION: Primary | ICD-10-CM

## 2025-04-30 DIAGNOSIS — Z86.69 HISTORY OF MIGRAINE: ICD-10-CM

## 2025-04-30 DIAGNOSIS — G47.33 OSA (OBSTRUCTIVE SLEEP APNEA): ICD-10-CM

## 2025-04-30 DIAGNOSIS — E78.2 MIXED HYPERLIPIDEMIA: ICD-10-CM

## 2025-04-30 DIAGNOSIS — E66.01 MORBID (SEVERE) OBESITY DUE TO EXCESS CALORIES (HCC): ICD-10-CM

## 2025-04-30 DIAGNOSIS — Z12.5 ENCOUNTER FOR SCREENING FOR MALIGNANT NEOPLASM OF PROSTATE: ICD-10-CM

## 2025-04-30 DIAGNOSIS — Z00.00 ROUTINE GENERAL MEDICAL EXAMINATION AT A HEALTH CARE FACILITY: ICD-10-CM

## 2025-04-30 DIAGNOSIS — E11.9 TYPE 2 DIABETES MELLITUS WITHOUT COMPLICATION, WITHOUT LONG-TERM CURRENT USE OF INSULIN (HCC): ICD-10-CM

## 2025-04-30 LAB
CREAT UR-SCNC: 211.3 MG/DL
HEMOGLOBIN A1C: 6.1 % (ref 4.3–5.6)
MICROALBUMIN UR-MCNC: 20.4 MG/DL
MICROALBUMIN/CREAT 24H UR-RTO: 96.5 UG/MG (ref ?–30)

## 2025-04-30 PROCEDURE — 82570 ASSAY OF URINE CREATININE: CPT | Performed by: FAMILY MEDICINE

## 2025-04-30 PROCEDURE — 82043 UR ALBUMIN QUANTITATIVE: CPT | Performed by: FAMILY MEDICINE

## 2025-04-30 NOTE — PROGRESS NOTES
Subjective:   Jorge A Duffy is a 69 year old male who presents for a Medicare Initial Annual Wellness visit (Once after 12 month Medicare anniversary) .     1. Encounter for annual health examination  2. Need for hepatitis C screening test  -due for wellness    3. Type 2 diabetes mellitus without complication, without long-term current use of insulin (HCC)  8. Left foot pain  -sugars: good  -Last A1c value was 6.1% done 4/30/2025.    -medication issues: diet controlled  -last eye exam: 07/08/2022    -denies hyper or hypo glycemic symptoms  -complains of numbness in foot at night    4. Mixed hyperlipidemia  -off statin  -due for lipids    5. Essential hypertension  -at goal - stopped olmesartan    6. ABBY (obstructive sleep apnea)  -stable not on cpap    7. Morbid (severe) obesity due to excess calories (HCC)  -trying to eat healthy  Last BMI: 36.71, Class 2 Obesity.  He was counseled on diet and exercise for elevated BMI which is affecting his hypertension, hyperlipidemia, and sleep apnea.      9. History of migraine  -better        History/Other:   Fall Risk Assessment:   He has been screened for Falls and is low risk.      Cognitive Assessment:   He had a completely normal cognitive assessment - see flowsheet entries     Functional Ability/Status:   Jorge A Duffy has a completely normal functional assessment. See flowsheet for details.        Depression Screening (PHQ-2/PHQ-9): PHQ-2 SCORE: 0  , done 4/30/2025   If you checked off any problems, how difficult have these problems made it for you to do your work, take care of things at home, or get along with other people?: Not difficult at all    Last [place] Suicide Screening on 4/30/2025 was No Risk.       Advanced Directives:   He does NOT have a Living Will. [Do you have a living will?: No]  He does NOT have a Power of  for Health Care. [Do you have a healthcare power of ?: No]  Discussed Advance Care Planning with patient (and  family/surrogate if present). Standard forms made available to patient in After Visit Summary.      Patient Active Problem List   Diagnosis    Essential hypertension    Mixed hyperlipidemia    Hyperglycemia    ABBY (obstructive sleep apnea)    Morbid (severe) obesity due to excess calories (HCC)    Special screening for malignant neoplasm of colon    Benign neoplasm of ascending colon    Colon polyp    Diverticulosis of colon     Allergies:  He has no known allergies.    Current Medications:  Outpatient Medications Marked as Taking for the 4/30/25 encounter (Office Visit) with Romeo Doan MD   Medication Sig    gabapentin 300 MG Oral Cap Take 1 capsule (300 mg total) by mouth in the morning, at noon, and at bedtime.    rosuvastatin 5 MG Oral Tab Take 1 tablet (5 mg total) by mouth nightly.    pramipexole 0.25 MG Oral Tab Take 1 tablet (0.25 mg total) by mouth daily.    latanoprost 0.005 % Ophthalmic Solution Apply 1 drop to eye nightly.    Upadacitinib ER (RINVOQ) 15 MG Oral Tablet 24 Hr Take 15 mg by mouth daily.    Glucose Blood (ACCU-CHEK GUIDE) In Vitro Strip 1 each by Finger stick route daily. E11.9    Alpha-Lipoic Acid 600 MG Oral Cap Take 1 capsule by mouth 2 (two) times a day.    Multiple Vitamin (MULTI-VITAMIN DAILY OR) Take by mouth.    Calcium Carbonate-Vit D-Min (CALCIUM 1200 OR) Take 1 capsule by mouth daily.    Glucosamine-Chondroit-Vit C-Mn (GLUCOSAMINE 1500 COMPLEX OR) Take 1 capsule by mouth daily.    B Complex Vitamins (B COMPLEX 100 OR) Take 1 tablet by mouth daily.       Medical History:  He  has a past medical history of Back pain (2013), Calculus of kidney (Oct / 2005), Itch of skin (Sept 2021), Rash, Sleep apnea (1995), Sleep disturbance (1995), and Wears glasses (2007).  Surgical History:  He  has a past surgical history that includes organ transplant (1989).   Family History:  His family history is not on file.  Social History:  He  reports that he has never smoked. He has never used  smokeless tobacco. He reports that he does not currently use alcohol after a past usage of about 1.0 standard drink of alcohol per week. He reports current drug use. Frequency: 2.00 times per week. Drug: Cannabis.    Tobacco:  He has never smoked tobacco.    CAGE Alcohol Screen:   CAGE screening score of 0 on 4/30/2025, showing low risk of alcohol abuse.      Patient Care Team:  Romeo Doan MD as PCP - General (Family Medicine)  Rochelle Chao DO as Neurologist (NEUROLOGY)    Review of Systems     Negative except above    Objective:   Physical Exam  General Appearance:  Alert, cooperative, no distress, appears stated age   Head:  Normocephalic, without obvious abnormality, atraumatic   Eyes:  PERRL   Ears:  Normal TM's and external ear canals, both ears   Lungs:   Clear to auscultation bilaterally, respirations unlabored   Heart:  Regular rate and rhythm   Extremities: Extremities normal, no cyanosis or edema   Skin: Skin color, texture, turgor normal; follicular erythematous rash in legs and lower abdomen and buttocks   Neurologic: No appreciable abnormality   Bilateral barefoot skin diabetic exam is normal, visualized feet and the appearance is normal.  Bilateral monofilament/sensation of both feet is normal.  Pulsation pedal pulse exam of both lower legs/feet is normal as well.    /78 (BP Location: Left arm, Patient Position: Sitting, Cuff Size: adult)   Pulse 62   Temp 97.3 °F (36.3 °C) (Temporal)   Resp 18   Ht 5' 7.72\" (1.72 m)   Wt 239 lb 6.4 oz (108.6 kg)   SpO2 95%   BMI 36.71 kg/m²  Estimated body mass index is 36.71 kg/m² as calculated from the following:    Height as of this encounter: 5' 7.72\" (1.72 m).    Weight as of this encounter: 239 lb 6.4 oz (108.6 kg).    Medicare Hearing Assessment:   Hearing Screening    Screening Method: Finger Rub  Finger Rub Result: Pass             Assessment & Plan:   Jorge A Duffy is a 69 year old male who presents for a Medicare Assessment.      1. Encounter for annual health examination  -reviewed age appropriate screening  -up to date on colonoscopy - due 2027  -check PSA later this year    2. Screening for malignant neoplasm of prostate  - PSA Total, Screen; Future    3. Type 2 diabetes mellitus without complication, without long-term current use of insulin (HCC)  -at goal  -c/w labs  -up to date on eye exam  -foot exam normal    - Microalb/Creat Ratio, Random Urine; Future  - POC Hgb A1C  - Microalb/Creat Ratio, Random Urine    4. Left foot pain  -stable on gabapentin    5. Essential hypertension  -at goal off med    6. Mixed hyperlipidemia  -recheck lipids - on rosuvastatin    7. ABBY (obstructive sleep apnea)  -not able to tolerate cpap  -will continue to monitor     8. Morbid (severe) obesity due to excess calories (HCC)  -c/w lifestyle changes            The patient indicates understanding of these issues and agrees to the plan.  Reinforced healthy diet, lifestyle, and exercise.      Return in about 6 months (around 10/30/2025).     TOM CID MD, 5/4/2022     Supplementary Documentation:   General Health:  In the past six months, have you lost more than 10 pounds without trying?: 2 - No  Has your appetite been poor?: No  Type of Diet: Balanced  How does the patient maintain a good energy level?: Appropriate Exercise, Daily Walks, Stretching  How would you describe your daily physical activity?: Moderate  How would you describe your current health state?: Good  How do you maintain positive mental well-being?: Visiting Family  On a scale of 0 to 10, with 0 being no pain and 10 being severe pain, what is your pain level?: 4 - (Moderate)  In the past six months, have you experienced urine leakage?: 0-No  At any time do you feel concerned for the safety/well-being of yourself and/or your children, in your home or elsewhere?: No  Have you had any immunizations at another office such as Influenza, Hepatitis B, Tetanus, or Pneumococcal?: No         Jorge A Duffy's SCREENING SCHEDULE   Tests on this list are recommended by your physician but may not be covered, or covered at this frequency, by your insurer.   Please check with your insurance carrier before scheduling to verify coverage.   PREVENTATIVE SERVICES FREQUENCY &  COVERAGE DETAILS LAST COMPLETION DATE   Diabetes Screening    Fasting Blood Sugar / Glucose    One screening every 12 months if never tested or if previously tested but not diagnosed with pre-diabetes   One screening every 6 months if diagnosed with pre-diabetes Lab Results   Component Value Date     (H) 04/11/2024        Cardiovascular Disease Screening    Lipid Panel  Cholesterol  Lipoprotein (HDL)  Triglycerides Covered every 5 years for all Medicare beneficiaries without apparent signs or symptoms of cardiovascular disease Lab Results   Component Value Date    CHOLEST 170 11/05/2024    HDL 61 (H) 11/05/2024    LDL 88 11/05/2024    TRIG 119 11/05/2024         Electrocardiogram (EKG)   Covered if needed at Welcome to Medicare, and non-screening if indicated for medical reasons -      Ultrasound Screening for Abdominal Aortic Aneurysm (AAA) Covered once in a lifetime for one of the following risk factors    Men who are 65-75 years old and have ever smoked    Anyone with a family history -     Colorectal Cancer Screening  Covered for ages 50-85; only need ONE of the following:    Colonoscopy   Covered every 10 years    Covered every 2 years if patient is at high risk or previous colonoscopy was abnormal 08/30/2022    Health Maintenance   Topic Date Due    Colorectal Cancer Screening  08/30/2027       Flexible Sigmoidoscopy   Covered every 4 years -    Fecal Occult Blood Test Covered annually -   Prostate Cancer Screening    Prostate-Specific Antigen (PSA) Annually No results found for: \"PSA\"  Health Maintenance   Topic Date Due    PSA  07/01/2026      Immunizations    Influenza Covered once per flu season  Please get every year  -  No recommendations at this time    Pneumococcal Each vaccine ([id number] & [id number]) covered once after 65 Prevnar 13: -    [id number]: 11/05/2020     Pneumococcal Vaccination(3 of 3 - PCV) due on 11/05/2021    Hepatitis B One screening covered for patients with certain risk factors   -  No recommendations at this time    Tetanus Toxoid Not covered by Medicare Part B unless medically necessary (cut with metal); may be covered with your pharmacy prescription benefits -    Tetanus, Diptheria and Pertusis TD and TDaP Not covered by Medicare Part B -  No recommendations at this time    Zoster Not covered by Medicare Part B; may be covered with your pharmacy  prescription benefits 10/18/2019  Zoster Vaccines(1 of 2) due on 12/13/2019     Diabetes      Hemoglobin A1C Annually; if last result is elevated, may be asked to retest more frequently.    Medicare covers every 3 months Lab Results   Component Value Date    A1C 6.1 (A) 04/30/2025       No recommendations at this time    Creat/alb ratio Annually Lab Results   Component Value Date    MICROALBCREA 40.7 (H) 05/07/2024       LDL Annually Lab Results   Component Value Date    LDL 88 11/05/2024       Dilated Eye Exam Annually Last Diabetic Eye Exam:  Last Dilated Eye Exam: 05/08/24  Eye Exam shows Diabetic Retinopathy?: No       Annual Monitoring of Persistent Medications (ACE/ARB, digoxin diuretics, anticonvulsants)    Potassium Annually Lab Results   Component Value Date    K 4.7 04/11/2024         Creatinine   Annually Lab Results   Component Value Date    CREATSERUM 1.17 04/11/2024         BUN Annually Lab Results   Component Value Date    BUN 25 (H) 04/11/2024       Drug Serum Conc Annually No results found for: \"DIGOXIN\", \"DIG\", \"VALP\"                 Overall 60 minutes was spent on this encounter, 40 mins spent reviewing and providing care coordination for these conditions: dm, htn, lipid, neuropathy  20  minutes was spent reviewing wellness elements and  providing age appropriate screenings.

## 2025-04-30 NOTE — PATIENT INSTRUCTIONS
Go to [facility] for fasting bloodwork    Continue medications as prescribed    Followup in 6 months, sooner if needed

## 2025-05-04 ENCOUNTER — PATIENT MESSAGE (OUTPATIENT)
Dept: FAMILY MEDICINE CLINIC | Facility: CLINIC | Age: 70
End: 2025-05-04

## 2025-05-05 ENCOUNTER — PATIENT OUTREACH (OUTPATIENT)
Dept: CASE MANAGEMENT | Age: 70
End: 2025-05-05

## 2025-05-05 NOTE — PROCEDURES
The office order to remove patient from the diabetes registry is Approved and finalized on May 5, 2025.

## 2025-05-07 ENCOUNTER — LAB ENCOUNTER (OUTPATIENT)
Dept: LAB | Age: 70
End: 2025-05-07
Attending: FAMILY MEDICINE
Payer: MEDICARE

## 2025-05-07 DIAGNOSIS — Z00.00 ROUTINE GENERAL MEDICAL EXAMINATION AT A HEALTH CARE FACILITY: ICD-10-CM

## 2025-05-07 LAB
ALBUMIN SERPL-MCNC: 4.6 G/DL (ref 3.2–4.8)
ALBUMIN/GLOB SERPL: 2 {RATIO} (ref 1–2)
ALP LIVER SERPL-CCNC: 36 U/L (ref 45–117)
ALT SERPL-CCNC: 53 U/L (ref 10–49)
ANION GAP SERPL CALC-SCNC: 6 MMOL/L (ref 0–18)
AST SERPL-CCNC: 55 U/L (ref ?–34)
BASOPHILS # BLD AUTO: 0.01 X10(3) UL (ref 0–0.2)
BASOPHILS NFR BLD AUTO: 0.3 %
BILIRUB SERPL-MCNC: 0.7 MG/DL (ref 0.2–1.1)
BUN BLD-MCNC: 22 MG/DL (ref 9–23)
CALCIUM BLD-MCNC: 9.8 MG/DL (ref 8.7–10.6)
CHLORIDE SERPL-SCNC: 106 MMOL/L (ref 98–112)
CHOLEST SERPL-MCNC: 162 MG/DL (ref ?–200)
CO2 SERPL-SCNC: 28 MMOL/L (ref 21–32)
CREAT BLD-MCNC: 1.23 MG/DL (ref 0.7–1.3)
EGFRCR SERPLBLD CKD-EPI 2021: 64 ML/MIN/1.73M2 (ref 60–?)
EOSINOPHIL # BLD AUTO: 0.01 X10(3) UL (ref 0–0.7)
EOSINOPHIL NFR BLD AUTO: 0.3 %
ERYTHROCYTE [DISTWIDTH] IN BLOOD BY AUTOMATED COUNT: 14.1 %
FASTING PATIENT LIPID ANSWER: YES
FASTING STATUS PATIENT QL REPORTED: YES
GLOBULIN PLAS-MCNC: 2.3 G/DL (ref 2–3.5)
GLUCOSE BLD-MCNC: 111 MG/DL (ref 70–99)
HCT VFR BLD AUTO: 43.7 % (ref 39–53)
HDLC SERPL-MCNC: 62 MG/DL (ref 40–59)
HGB BLD-MCNC: 14.6 G/DL (ref 13–17.5)
IMM GRANULOCYTES # BLD AUTO: 0.02 X10(3) UL (ref 0–1)
IMM GRANULOCYTES NFR BLD: 0.5 %
LDLC SERPL CALC-MCNC: 79 MG/DL (ref ?–100)
LYMPHOCYTES # BLD AUTO: 1.43 X10(3) UL (ref 1–4)
LYMPHOCYTES NFR BLD AUTO: 35.8 %
MCH RBC QN AUTO: 31.2 PG (ref 26–34)
MCHC RBC AUTO-ENTMCNC: 33.4 G/DL (ref 31–37)
MCV RBC AUTO: 93.4 FL (ref 80–100)
MONOCYTES # BLD AUTO: 0.5 X10(3) UL (ref 0.1–1)
MONOCYTES NFR BLD AUTO: 12.5 %
NEUTROPHILS # BLD AUTO: 2.03 X10 (3) UL (ref 1.5–7.7)
NEUTROPHILS # BLD AUTO: 2.03 X10(3) UL (ref 1.5–7.7)
NEUTROPHILS NFR BLD AUTO: 50.6 %
NONHDLC SERPL-MCNC: 100 MG/DL (ref ?–130)
OSMOLALITY SERPL CALC.SUM OF ELEC: 294 MOSM/KG (ref 275–295)
PLATELET # BLD AUTO: 222 10(3)UL (ref 150–450)
POTASSIUM SERPL-SCNC: 4.4 MMOL/L (ref 3.5–5.1)
PROT SERPL-MCNC: 6.9 G/DL (ref 5.7–8.2)
RBC # BLD AUTO: 4.68 X10(6)UL (ref 3.8–5.8)
SODIUM SERPL-SCNC: 140 MMOL/L (ref 136–145)
TRIGL SERPL-MCNC: 122 MG/DL (ref 30–149)
TSI SER-ACNC: 0.94 UIU/ML (ref 0.55–4.78)
VLDLC SERPL CALC-MCNC: 19 MG/DL (ref 0–30)
WBC # BLD AUTO: 4 X10(3) UL (ref 4–11)

## 2025-05-07 PROCEDURE — 84443 ASSAY THYROID STIM HORMONE: CPT

## 2025-05-07 PROCEDURE — 36415 COLL VENOUS BLD VENIPUNCTURE: CPT

## 2025-05-07 PROCEDURE — 80061 LIPID PANEL: CPT

## 2025-05-07 PROCEDURE — 85025 COMPLETE CBC W/AUTO DIFF WBC: CPT

## 2025-05-07 PROCEDURE — 80053 COMPREHEN METABOLIC PANEL: CPT

## 2025-05-29 ENCOUNTER — MED REC SCAN ONLY (OUTPATIENT)
Dept: FAMILY MEDICINE CLINIC | Facility: CLINIC | Age: 70
End: 2025-05-29

## 2025-05-29 RX ORDER — TELMISARTAN 20 MG/1
20 TABLET ORAL DAILY
Qty: 90 TABLET | Refills: 1 | Status: SHIPPED | OUTPATIENT
Start: 2025-05-29

## 2025-06-10 RX ORDER — PRAMIPEXOLE DIHYDROCHLORIDE 0.25 MG/1
0.25 TABLET ORAL DAILY
Qty: 90 TABLET | Refills: 1 | Status: SHIPPED | OUTPATIENT
Start: 2025-06-10

## 2025-07-01 ENCOUNTER — PATIENT MESSAGE (OUTPATIENT)
Dept: FAMILY MEDICINE CLINIC | Facility: CLINIC | Age: 70
End: 2025-07-01

## 2025-07-01 DIAGNOSIS — R76.8 P-ANCA TITER POSITIVE: ICD-10-CM

## 2025-07-01 DIAGNOSIS — H02.849: Primary | ICD-10-CM

## 2025-07-15 RX ORDER — ROSUVASTATIN CALCIUM 5 MG/1
5 TABLET, COATED ORAL NIGHTLY
Qty: 90 TABLET | Refills: 3 | Status: SHIPPED | OUTPATIENT
Start: 2025-07-15

## 2025-07-21 ENCOUNTER — PATIENT MESSAGE (OUTPATIENT)
Dept: FAMILY MEDICINE CLINIC | Facility: CLINIC | Age: 70
End: 2025-07-21

## 2025-07-22 NOTE — TELEPHONE ENCOUNTER
Pt is asking to increase the gabapentin dose from 300mg daily to 600mg daily?    And is asking for a steroid to reduce the inflammation-vascular?

## 2025-07-24 ENCOUNTER — PATIENT MESSAGE (OUTPATIENT)
Dept: FAMILY MEDICINE CLINIC | Facility: CLINIC | Age: 70
End: 2025-07-24

## 2025-07-24 DIAGNOSIS — R79.89 ELEVATED LFTS: Primary | ICD-10-CM

## 2025-07-24 RX ORDER — GABAPENTIN 600 MG/1
600 TABLET ORAL 3 TIMES DAILY
Qty: 270 TABLET | Refills: 1 | Status: SHIPPED | OUTPATIENT
Start: 2025-07-24

## 2025-07-25 RX ORDER — TELMISARTAN 20 MG/1
20 TABLET ORAL DAILY
Qty: 90 TABLET | Refills: 1 | Status: SHIPPED | OUTPATIENT
Start: 2025-07-25

## 2025-07-25 NOTE — TELEPHONE ENCOUNTER
[facility] requesting medication refill for Telmisartan 20 MG Oral Tab .    LOV: 4/30/2025   Prescribed By: Dr. Romeo Doan   Pharmacy Location: [facility] - [address] [phone], [phone]     Next Appointment: Visit date not found      Informed patient to allow up to 24 to 48 business hours before checking with Pharmacy.

## 2025-08-19 ENCOUNTER — LAB ENCOUNTER (OUTPATIENT)
Dept: LAB | Age: 70
End: 2025-08-19
Attending: STUDENT IN AN ORGANIZED HEALTH CARE EDUCATION/TRAINING PROGRAM

## 2025-08-19 DIAGNOSIS — R79.89 ELEVATED LFTS: ICD-10-CM

## 2025-08-19 PROBLEM — L25.9 CONTACT DERMATITIS: Status: ACTIVE | Noted: 2019-08-12

## 2025-08-19 PROBLEM — E78.5 HYPERLIPIDEMIA: Status: ACTIVE | Noted: 2019-08-12

## 2025-08-19 PROBLEM — E78.00 HYPERCHOLESTEROLEMIA: Status: ACTIVE | Noted: 2020-06-04

## 2025-08-19 PROBLEM — R20.2 PARESTHESIA OF BOTH LEGS: Status: ACTIVE | Noted: 2017-03-01

## 2025-08-19 PROBLEM — R73.09 ABNORMAL GLUCOSE: Status: ACTIVE | Noted: 2019-08-12

## 2025-08-19 PROBLEM — N20.0 STAGHORN CALCULUS: Status: ACTIVE | Noted: 2019-08-12

## 2025-08-19 PROBLEM — I10 BENIGN ESSENTIAL HYPERTENSION: Status: ACTIVE | Noted: 2019-08-12

## 2025-08-19 PROBLEM — H61.21 IMPACTED CERUMEN OF RIGHT EAR: Status: ACTIVE | Noted: 2019-08-12

## 2025-08-19 PROBLEM — R07.9 CHEST PAIN: Status: ACTIVE | Noted: 2019-08-12

## 2025-08-19 PROBLEM — R73.03 PREDIABETES: Status: ACTIVE | Noted: 2019-08-12

## 2025-08-19 PROBLEM — B37.2 CANDIDAL INTERTRIGO: Status: ACTIVE | Noted: 2021-08-18

## 2025-08-19 PROBLEM — N45.3 ORCHITIS AND EPIDIDYMITIS: Status: ACTIVE | Noted: 2019-08-12

## 2025-08-19 PROBLEM — R73.01 ELEVATED FASTING GLUCOSE: Status: ACTIVE | Noted: 2019-08-12

## 2025-08-19 PROBLEM — H04.123 INSUFFICIENCY OF TEAR FILM OF BOTH EYES: Status: ACTIVE | Noted: 2020-11-04

## 2025-08-19 PROBLEM — M62.830 MUSCLE SPASM OF BACK: Status: ACTIVE | Noted: 2019-08-12

## 2025-08-19 PROBLEM — M54.50 LOWER BACK PAIN: Status: ACTIVE | Noted: 2019-08-12

## 2025-08-19 PROBLEM — G43.109 MIGRAINE HEADACHE WITH AURA: Status: ACTIVE | Noted: 2018-12-24

## 2025-08-19 PROBLEM — E66.9 OBESITY: Status: ACTIVE | Noted: 2019-08-12

## 2025-08-19 PROBLEM — I10 HYPERTENSIVE DISORDER: Status: ACTIVE | Noted: 2020-06-04

## 2025-08-19 PROBLEM — S82.209A CLOSED FRACTURE OF TIBIA, SHAFT: Status: ACTIVE | Noted: 2019-08-12

## 2025-08-19 PROBLEM — B07.0 PLANTAR WARTS: Status: ACTIVE | Noted: 2019-08-12

## 2025-08-19 PROBLEM — M79.673 HEEL PAIN: Status: ACTIVE | Noted: 2019-08-14

## 2025-08-19 PROBLEM — M54.16 LEFT LUMBAR RADICULOPATHY: Status: ACTIVE | Noted: 2018-12-24

## 2025-08-19 LAB
CK SERPL-CCNC: 677 U/L (ref 46–171)
DEPRECATED HBV CORE AB SER IA-ACNC: 171 NG/ML (ref 50–336)
HAV AB SER QL IA: REACTIVE
HAV IGM SER QL: NONREACTIVE
HBV SURFACE AB SER QL: REACTIVE
HBV SURFACE AB SERPL IA-ACNC: >1000 MIU/ML
HBV SURFACE AG SER-ACNC: <0.1
HBV SURFACE AG SERPL QL IA: NONREACTIVE
IGA SERPL-MCNC: 196 MG/DL (ref 40–350)
IRON SATN MFR SERPL: 37 % (ref 20–50)
IRON SERPL-MCNC: 126 UG/DL (ref 65–175)
TOTAL IRON BINDING CAPACITY: 344 UG/DL (ref 250–425)
TRANSFERRIN SERPL-MCNC: 274 MG/DL (ref 215–365)

## 2025-08-19 PROCEDURE — 86708 HEPATITIS A ANTIBODY: CPT

## 2025-08-19 PROCEDURE — 87340 HEPATITIS B SURFACE AG IA: CPT

## 2025-08-19 PROCEDURE — 82728 ASSAY OF FERRITIN: CPT

## 2025-08-19 PROCEDURE — 86231 EMA EACH IG CLASS: CPT

## 2025-08-19 PROCEDURE — 82784 ASSAY IGA/IGD/IGG/IGM EACH: CPT

## 2025-08-19 PROCEDURE — 86709 HEPATITIS A IGM ANTIBODY: CPT

## 2025-08-19 PROCEDURE — 83550 IRON BINDING TEST: CPT

## 2025-08-19 PROCEDURE — 36415 COLL VENOUS BLD VENIPUNCTURE: CPT

## 2025-08-19 PROCEDURE — 86706 HEP B SURFACE ANTIBODY: CPT

## 2025-08-19 PROCEDURE — 82550 ASSAY OF CK (CPK): CPT

## 2025-08-19 PROCEDURE — 86364 TISS TRNSGLTMNASE EA IG CLAS: CPT

## 2025-08-19 PROCEDURE — 83540 ASSAY OF IRON: CPT

## 2025-08-19 PROCEDURE — 83516 IMMUNOASSAY NONANTIBODY: CPT

## 2025-08-20 LAB — ACTIN SMOOTH MUSCLE AB: 17 UNITS

## 2025-08-21 LAB
ENDOMYSIAL IGA AB: NEGATIVE
TTG IGA SER-ACNC: <0.2 U/ML (ref ?–7)

## 2025-08-29 ENCOUNTER — PATIENT OUTREACH (OUTPATIENT)
Dept: CASE MANAGEMENT | Age: 70
End: 2025-08-29